# Patient Record
Sex: MALE | Race: WHITE | NOT HISPANIC OR LATINO | Employment: FULL TIME | URBAN - METROPOLITAN AREA
[De-identification: names, ages, dates, MRNs, and addresses within clinical notes are randomized per-mention and may not be internally consistent; named-entity substitution may affect disease eponyms.]

---

## 2017-12-06 ENCOUNTER — NURSE TRIAGE (OUTPATIENT)
Dept: NURSING | Facility: CLINIC | Age: 22
End: 2017-12-06

## 2017-12-06 NOTE — TELEPHONE ENCOUNTER
Additional Information    Negative: Shock suspected (very weak, limp, not moving, too weak to stand, pale cool skin)    Negative: Sounds like a life-threatening emergency to the triager    Negative: Vomiting occurs without diarrhea    Negative: Diarrhea is the main symptom (vomiting is resolved)    Negative: [1] Vomiting and/or diarrhea is present AND [2] age > 1 year AND [3] ate spoiled food in previous 12 hours    Negative: [1] Diarrhea present AND [2] sounds like infant spitting up (reflux)    Negative: Severe dehydration suspected (very dizzy when tries to stand or has fainted)    Negative: [1] Blood (red or coffee grounds color) in the vomit AND [2] not from a nosebleed  (Exception: Few streaks AND only occurs once AND age > 1 year)    Negative: Difficult to awaken    Negative: Confused (delirious) when awake    Negative: Poisoning suspected (with a medicine, plant or chemical)    Negative: [1] Age < 12 weeks AND [2] fever 100.4 F (38.0 C) or higher rectally    Negative: [1]  (< 1 month old) AND [2] starts to look or act abnormal in any way (e.g., decrease in activity or feeding)    Negative: [1] Bile (green color) in the vomit AND [2] 2 or more times (Exception: Stomach juice which is yellow)    Negative: [1] Age < 12 months AND [2] bile (green color) in the vomit (Exception: Stomach juice which is yellow)    Negative: [1] SEVERE abdominal pain (when not vomiting) AND [2] present > 1 hour    Negative: Appendicitis suspected (e.g., constant pain > 2 hours, RLQ location, walks bent over holding abdomen, jumping makes pain worse, etc)    Negative: [1] Blood in the diarrhea AND [2] 3 or more times (or large amount)    Negative: [1] Dehydration suspected AND [2] age < 1 year (Signs: no urine > 8 hours AND very dry mouth, no tears, ill appearing, etc.)    Negative: [1] Dehydration suspected AND [2] age > 1 year (Signs: no urine > 12 hours AND very dry mouth, no tears, ill appearing, etc.)    Negative:  High-risk child (e.g., diabetes mellitus, recent abdominal surgery)    Negative: [1] Fever AND [2] > 105 F (40.6 C) by any route OR axillary > 104 F (40 C)    Negative: [1] Fever AND [2] weak immune system (sickle cell disease, HIV, splenectomy, chemotherapy, organ transplant, chronic oral steroids, etc)    Negative: Child sounds very sick or weak to the triager    Negative: [1] Age < 12 weeks AND [2] vomited 3 or more times in last 24 hours  (Exception: reflux or spitting up)    Negative: [1] Age < 1 year old AND [2] after receiving frequent sips of ORS per guideline AND [3] continues to vomit 3 or more times AND [4] also has frequent watery diarrhea    Negative: [1] SEVERE vomiting (vomiting everything) > 8 hours (> 12 hours for > 7 yo) AND [2] continues after giving frequent sips of ORS using correct technique per guideline    Negative: [1] Continuous abdominal pain or crying AND [2] persists > 2 hours  (Caution: intermittent abdominal pain that comes on with vomiting and then goes away is common)    Negative: Vomiting an essential medicine    Negative: [1] Recent hospitalization AND [2] child not improved or WORSE    Negative: [1] Age < 1 year old AND [2] MODERATE vomiting (3-7 times/day) with diarrhea AND [3] present > 24 hours    Negative: [1] Age > 1 year old AND [2] MODERATE vomiting (3-7 times/day) with diarrhea AND [3] present > 48 hours    Negative: [1] Blood in the stool AND [2] 1 or 2 times AND [3] small amount    Negative: Fever present > 3 days (72 hours)    Negative: [1] MILD vomiting (1-2 times/day) with diarrhea AND [2] persists > 1 week    Negative: Vomiting is a chronic problem (recurrent or ongoing AND present > 4 weeks)    Negative: [1] SEVERE vomiting (8 or more times/day OR vomits everything) with diarrhea BUT [2] hydrated    Negative: [1] MODERATE vomiting (3-7 times/day) with diarrhea AND [2] age < 1 year old AND [3] present < 24 hours    [1] MODERATE vomiting (3-7 times/day) with diarrhea  AND [2] age > 1 year old AND [3] present < 48 hours    Protocols used: VOMITING WITH DIARRHEA-PEDIATRIC-    Van calls and says that He has vomited at least 5 times today and has the diarrhea, too. Pt. Says that he thinks that he has the stomach flu. Temperature is unknown.

## 2019-07-09 ENCOUNTER — ANCILLARY PROCEDURE (OUTPATIENT)
Dept: GENERAL RADIOLOGY | Facility: CLINIC | Age: 24
End: 2019-07-09
Attending: NURSE PRACTITIONER
Payer: COMMERCIAL

## 2019-07-09 ENCOUNTER — OFFICE VISIT (OUTPATIENT)
Dept: FAMILY MEDICINE | Facility: CLINIC | Age: 24
End: 2019-07-09
Payer: COMMERCIAL

## 2019-07-09 VITALS
BODY MASS INDEX: 27.24 KG/M2 | TEMPERATURE: 98.3 F | HEART RATE: 58 BPM | RESPIRATION RATE: 12 BRPM | WEIGHT: 201.1 LBS | DIASTOLIC BLOOD PRESSURE: 70 MMHG | HEIGHT: 72 IN | OXYGEN SATURATION: 97 % | SYSTOLIC BLOOD PRESSURE: 118 MMHG

## 2019-07-09 DIAGNOSIS — S89.92XD INJURY OF LEFT KNEE, SUBSEQUENT ENCOUNTER: Primary | ICD-10-CM

## 2019-07-09 DIAGNOSIS — S89.92XD INJURY OF LEFT KNEE, SUBSEQUENT ENCOUNTER: ICD-10-CM

## 2019-07-09 PROCEDURE — 99203 OFFICE O/P NEW LOW 30 MIN: CPT | Performed by: NURSE PRACTITIONER

## 2019-07-09 PROCEDURE — 73562 X-RAY EXAM OF KNEE 3: CPT | Mod: LT

## 2019-07-09 ASSESSMENT — PAIN SCALES - GENERAL: PAINLEVEL: MILD PAIN (2)

## 2019-07-09 ASSESSMENT — MIFFLIN-ST. JEOR: SCORE: 1955.18

## 2019-07-09 NOTE — LETTER
[facility]  [address] [address]  [phone]          July 9, 2019    RE:  Van Celaya                                                                                                                                                       [address] [place] [address]            To whom it may concern:    Van Celaya was seen in my clinic today for follow up of his knee injury. He had xrays done. Will need an MRI schedule in the next one week.        Sincerely,        Juana Naidu, CNP

## 2019-07-09 NOTE — PATIENT INSTRUCTIONS
Schedule MRI: [phone]      Ice for 15-20 minutes 4-6 times daily.   NSAID (ibuprofen 600 mg every 6 hours or aleve 2 tabs twice daily) for pain and inflammation.          Thank you for choosing [facility].  You may be receiving an email and/or telephone survey request from [facility] Customer Experience regarding your visit today.  Please take a few minutes to respond to the survey to let us know how we are doing.      If you have questions or concerns, please contact us via [employer] or you can contact your care team at [phone].    Our Clinic hours are:  Monday 6:40 am  to 7:00 pm  Tuesday -Friday 6:40 am to 5:00 pm    The [place] outpatient lab hours are:  Monday - Friday 6:10 am to 4:45 pm  Saturdays 7:00 am to 11:00 am  Appointments are required, call [phone]    If you have clinical questions after hours or would like to schedule an appointment,  call the clinic at [phone].

## 2019-07-09 NOTE — PROGRESS NOTES
"Subjective     Van Celaya is a 23 year old male who presents to clinic today for the following health issues:      Joint Pain  - Not sure if this will be work comp or not yet, not sure if these current symptoms are related     Onset: Recently 2 days ago    Did have work injury 2 months ago- was seen at [facility]     Current pain is the same as it was at initial injury 2 months ago    Description:   Location: left knee  Character: Dull ache, tightness     Intensity: mild to severe     Progression of Symptoms: worse    Accompanying Signs & Symptoms:  Other symptoms: numbness, swelling and \"popping\", tightness  Pain is worse with bending /using knee.     History:   Previous similar pain: YES- 2 months ago injured knee at work lifting a heavy object       Precipitating factors:   Trauma or overuse: YES- 2 months ago injured knee while lifting. 2 days ago knee started hurting again - no new trauma or event, just started again out of the blue.    Alleviating factors:  Improved by: rest/inactivity and ice    Therapies Tried and outcome: rest, ice- helps pain/tightness- Not swelling               Reviewed and updated as needed this visit by Provider  Tobacco  Allergies  Meds  Problems  Med Hx  Surg Hx  Fam Hx         Review of Systems   ROS COMP: Constitutional, HEENT, cardiovascular, pulmonary, gi and gu systems are negative, except as otherwise noted.      Objective    /70 (BP Location: Right arm, Patient Position: Chair, Cuff Size: Adult Regular)   Pulse 58   Temp 98.3  F (36.8  C) (Tympanic)   Resp 12   Ht 1.829 m (6')   Wt 91.2 kg (201 lb 1.6 oz)   SpO2 97%   BMI 27.27 kg/m    Body mass index is 27.27 kg/m .  Physical Exam   GENERAL: healthy, alert and no distress  MS: knee exam swelling, tenderness to palpation-along medial joint line, range of motion flexion mildly reduced otherwise ROM normal, collateral ligaments intact to stress, negative Matthew sign, negative Lachmann " sign      Xray independently reviewed. Radiologist read pending.          Assessment & Plan       ICD-10-CM    1. Injury of left knee, subsequent encounter S89.92XD XR Knee Left 3 Views     MR Knee Left w/o Contrast     Injury at work 2 months ago.  Pain has returned, now with swelling, popping and feelings of instability.  xrays negative.     Patient Instructions     Schedule MRI: [phone]      Ice for 15-20 minutes 4-6 times daily.   NSAID (ibuprofen 600 mg every 6 hours or aleve 2 tabs twice daily) for pain and inflammation.          Thank you for choosing [facility].  You may be receiving an email and/or telephone survey request from [facility] Customer Experience regarding your visit today.  Please take a few minutes to respond to the survey to let us know how we are doing.      If you have questions or concerns, please contact us via [employer] or you can contact your care team at [phone].    Our Clinic hours are:  Monday 6:40 am  to 7:00 pm  Tuesday -Friday 6:40 am to 5:00 pm    The [place] outpatient lab hours are:  Monday - Friday 6:10 am to 4:45 pm  Saturdays 7:00 am to 11:00 am  Appointments are required, call [phone]    If you have clinical questions after hours or would like to schedule an appointment,  call the clinic at [phone].        Return in about 1 week (around 7/16/2019) for schedule MRI.    SHEMAR Barboza CNP  [facility]

## 2022-12-26 ENCOUNTER — HOSPITAL ENCOUNTER (EMERGENCY)
Facility: CLINIC | Age: 27
Discharge: HOME | End: 2022-12-26
Attending: NURSE PRACTITIONER | Admitting: NURSE PRACTITIONER
Payer: COMMERCIAL

## 2022-12-26 VITALS
BODY MASS INDEX: 27.26 KG/M2 | WEIGHT: 201 LBS | RESPIRATION RATE: 18 BRPM | OXYGEN SATURATION: 96 % | DIASTOLIC BLOOD PRESSURE: 73 MMHG | TEMPERATURE: 98.2 F | SYSTOLIC BLOOD PRESSURE: 118 MMHG | HEART RATE: 62 BPM

## 2022-12-26 DIAGNOSIS — M25.562 ACUTE PAIN OF LEFT KNEE: ICD-10-CM

## 2022-12-26 PROCEDURE — 99202 OFFICE O/P NEW SF 15 MIN: CPT | Performed by: NURSE PRACTITIONER

## 2022-12-26 PROCEDURE — G0463 HOSPITAL OUTPT CLINIC VISIT: HCPCS | Performed by: NURSE PRACTITIONER

## 2022-12-26 ASSESSMENT — ACTIVITIES OF DAILY LIVING (ADL): ADLS_ACUITY_SCORE: 35

## 2022-12-26 NOTE — Clinical Note
Van Celaya was seen and treated in our emergency department on 12/26/2022.  He may return to work on 12/27/2022.       If you have any questions or concerns, please don't hesitate to call.      Medina Guillen, SHEMAR CNP

## 2022-12-27 NOTE — DISCHARGE INSTRUCTIONS
I recommend schedule an MRI at [facility] on your way out the door.  Tomorrow morning call orthopedics on the discharge paperwork and schedule a follow-up ointment with [facility] in [place] at [facility].  Provide note for work.

## 2022-12-27 NOTE — ED PROVIDER NOTES
"  History     Chief Complaint   Patient presents with     Knee Pain     HPI  Van Celaya is a 27 year old male who presents to urgent care with 2-week history of left knee pain.  Patient had previous knee surgery on August 6, 2019 by Dr. Trejo of [facility].  Patient reports he was doing just fine until couple months ago when he had some intermittent pain.  Patient states over the past 2 weeks he has increasing pain below the kneecap.  Patient states previously he had a bone chip and this required a \"pin and screw\".  Patient denies any recent trauma and states he has been taking ibuprofen 800 mg daily as needed for pain.  Patient denying any loss of sensation or in stability.  He reports the pain is severe with weightbearing and lifting activities.  Patient otherwise reports he is well and denies any chest pain or cough or shortness of breath or difficulty breathing  Allergies:  No Known Allergies    Problem List:    There are no problems to display for this patient.       Past Medical History:    History reviewed. No pertinent past medical history.    Past Surgical History:    History reviewed. No pertinent surgical history.    Family History:    Family History   Problem Relation Age of Onset     Colorectal Cancer Mother         Stage 4 Colon , spread to Lung and Liver      Cancer Paternal Grandmother         Unsure what type        Social History:  Marital Status:  Single [1]  Social History     Tobacco Use     Smoking status: Every Day     Types: Cigarettes     Smokeless tobacco: Never     Tobacco comments:     4 cigarettes per day    Substance Use Topics     Alcohol use: Yes     Comment: 30 Beers Per Week      Drug use: Never        Medications:    No current outpatient medications on file.      Review of Systems  As mentioned above in the history present illness. All other systems were reviewed and are negative.    Physical Exam   BP: 118/73  Pulse: 62  Temp: 98.2  F (36.8  C)  Resp: 18  Weight: " 91.2 kg (201 lb)  SpO2: 96 %      Physical Exam  Vitals and nursing note reviewed.   Constitutional:       General: He is not in acute distress.     Appearance: He is well-developed. He is not diaphoretic.   HENT:      Head: Normocephalic and atraumatic.      Right Ear: Hearing and external ear normal.      Left Ear: Hearing and external ear normal.      Nose: Nose normal.   Eyes:      General: No scleral icterus.        Right eye: No discharge.         Left eye: No discharge.      Conjunctiva/sclera: Conjunctivae normal.   Cardiovascular:      Rate and Rhythm: Normal rate and regular rhythm.      Heart sounds: Normal heart sounds. No murmur heard.    No friction rub. No gallop.   Pulmonary:      Effort: Pulmonary effort is normal. No respiratory distress.      Breath sounds: Normal breath sounds. No stridor. No wheezing or rales.   Musculoskeletal:         General: No tenderness.      Comments: No obvious palpable deformity of the knee.  No visible swelling noted.   Skin:     General: Skin is warm.      Capillary Refill: Capillary refill takes less than 2 seconds.      Findings: No rash.   Neurological:      Mental Status: He is alert and oriented to person, place, and time.   Psychiatric:         Behavior: Behavior is cooperative.         ED Course                 Procedures    No results found for this or any previous visit (from the past 24 hour(s)).    Medications - No data to display    Assessments & Plan (with Medical Decision Making)     I have reviewed the nursing notes.    I have reviewed the findings, diagnosis, plan and need for follow up with the patient.  27-year-old male presenting to urgent care with acute on chronic left knee pain with previous history of what sounds like ligamentous internal derangement with avulsion type fracture and subsequent surgical repair in 2019.  Patient reporting worsening symptoms over the last 2 weeks.  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  Patient has been treating with ibuprofen without resolution.  Patient denying loss of sensation in the lower extremities.  Low suspicion for clot or infectious etiology.  Will order MRI to be completed on an outpatient basis and feel that x-rays would be of low yield based on previous history and current symptoms.  Orthopedic referral placed for follow-up after the MRI.  Patient verbalized understanding.  Patient discharged in stable condition    There are no discharge medications for this patient.      Final diagnoses:   Acute pain of left knee       12/26/2022   [facility] EMERGENCY DEPT     Medina Guillen, SHEMAR CNP  12/26/22 2034

## 2022-12-28 ENCOUNTER — HOSPITAL ENCOUNTER (OUTPATIENT)
Dept: MRI IMAGING | Facility: HOSPITAL | Age: 27
Discharge: HOME | End: 2022-12-28
Attending: NURSE PRACTITIONER | Admitting: NURSE PRACTITIONER
Payer: COMMERCIAL

## 2022-12-28 DIAGNOSIS — M25.562 ACUTE PAIN OF LEFT KNEE: ICD-10-CM

## 2022-12-28 PROCEDURE — 73721 MRI JNT OF LWR EXTRE W/O DYE: CPT | Mod: LT

## 2023-05-11 ENCOUNTER — HOSPITAL ENCOUNTER (EMERGENCY)
Facility: CLINIC | Age: 28
Discharge: HOME | End: 2023-05-11
Attending: FAMILY MEDICINE | Admitting: FAMILY MEDICINE
Payer: COMMERCIAL

## 2023-05-11 VITALS
WEIGHT: 200 LBS | OXYGEN SATURATION: 97 % | BODY MASS INDEX: 27.09 KG/M2 | HEIGHT: 72 IN | SYSTOLIC BLOOD PRESSURE: 138 MMHG | RESPIRATION RATE: 16 BRPM | TEMPERATURE: 96.8 F | HEART RATE: 75 BPM | DIASTOLIC BLOOD PRESSURE: 84 MMHG

## 2023-05-11 DIAGNOSIS — G47.00 INSOMNIA, UNSPECIFIED TYPE: ICD-10-CM

## 2023-05-11 DIAGNOSIS — F41.9 ANXIETY: ICD-10-CM

## 2023-05-11 LAB
ALBUMIN SERPL BCG-MCNC: 4.6 G/DL (ref 3.5–5.2)
ALP SERPL-CCNC: 65 U/L (ref 40–129)
ALT SERPL W P-5'-P-CCNC: 21 U/L (ref 10–50)
ANION GAP SERPL CALCULATED.3IONS-SCNC: 16 MMOL/L (ref 7–15)
AST SERPL W P-5'-P-CCNC: 22 U/L (ref 10–50)
BASOPHILS # BLD AUTO: 0 10E3/UL (ref 0–0.2)
BASOPHILS NFR BLD AUTO: 0 %
BILIRUB SERPL-MCNC: 0.6 MG/DL
BUN SERPL-MCNC: 8.9 MG/DL (ref 6–20)
CALCIUM SERPL-MCNC: 9.8 MG/DL (ref 8.6–10)
CHLORIDE SERPL-SCNC: 95 MMOL/L (ref 98–107)
CREAT SERPL-MCNC: 0.97 MG/DL (ref 0.67–1.17)
DEPRECATED HCO3 PLAS-SCNC: 22 MMOL/L (ref 22–29)
EOSINOPHIL # BLD AUTO: 0 10E3/UL (ref 0–0.7)
EOSINOPHIL NFR BLD AUTO: 0 %
ERYTHROCYTE [DISTWIDTH] IN BLOOD BY AUTOMATED COUNT: 12 % (ref 10–15)
GFR SERPL CREATININE-BSD FRML MDRD: >90 ML/MIN/1.73M2
GLUCOSE SERPL-MCNC: 103 MG/DL (ref 70–99)
HCT VFR BLD AUTO: 41.1 % (ref 40–53)
HGB BLD-MCNC: 14.5 G/DL (ref 13.3–17.7)
IMM GRANULOCYTES # BLD: 0 10E3/UL
IMM GRANULOCYTES NFR BLD: 0 %
LIPASE SERPL-CCNC: 17 U/L (ref 13–60)
LYMPHOCYTES # BLD AUTO: 2.7 10E3/UL (ref 0.8–5.3)
LYMPHOCYTES NFR BLD AUTO: 29 %
MCH RBC QN AUTO: 30.3 PG (ref 26.5–33)
MCHC RBC AUTO-ENTMCNC: 35.3 G/DL (ref 31.5–36.5)
MCV RBC AUTO: 86 FL (ref 78–100)
MONOCYTES # BLD AUTO: 0.8 10E3/UL (ref 0–1.3)
MONOCYTES NFR BLD AUTO: 8 %
NEUTROPHILS # BLD AUTO: 5.8 10E3/UL (ref 1.6–8.3)
NEUTROPHILS NFR BLD AUTO: 63 %
NRBC # BLD AUTO: 0 10E3/UL
NRBC BLD AUTO-RTO: 0 /100
PLATELET # BLD AUTO: 377 10E3/UL (ref 150–450)
POTASSIUM SERPL-SCNC: 4.5 MMOL/L (ref 3.4–5.3)
PROT SERPL-MCNC: 7.6 G/DL (ref 6.4–8.3)
RBC # BLD AUTO: 4.78 10E6/UL (ref 4.4–5.9)
SODIUM SERPL-SCNC: 133 MMOL/L (ref 136–145)
TROPONIN T SERPL HS-MCNC: <6 NG/L
WBC # BLD AUTO: 9.3 10E3/UL (ref 4–11)

## 2023-05-11 PROCEDURE — 85025 COMPLETE CBC W/AUTO DIFF WBC: CPT | Performed by: FAMILY MEDICINE

## 2023-05-11 PROCEDURE — 99284 EMERGENCY DEPT VISIT MOD MDM: CPT | Mod: 25 | Performed by: FAMILY MEDICINE

## 2023-05-11 PROCEDURE — 36415 COLL VENOUS BLD VENIPUNCTURE: CPT | Performed by: FAMILY MEDICINE

## 2023-05-11 PROCEDURE — 93005 ELECTROCARDIOGRAM TRACING: CPT | Performed by: FAMILY MEDICINE

## 2023-05-11 PROCEDURE — 80053 COMPREHEN METABOLIC PANEL: CPT | Performed by: FAMILY MEDICINE

## 2023-05-11 PROCEDURE — 99285 EMERGENCY DEPT VISIT HI MDM: CPT | Mod: 25 | Performed by: FAMILY MEDICINE

## 2023-05-11 PROCEDURE — 93010 ELECTROCARDIOGRAM REPORT: CPT | Performed by: FAMILY MEDICINE

## 2023-05-11 PROCEDURE — 84484 ASSAY OF TROPONIN QUANT: CPT | Performed by: FAMILY MEDICINE

## 2023-05-11 PROCEDURE — 83690 ASSAY OF LIPASE: CPT | Performed by: FAMILY MEDICINE

## 2023-05-11 RX ORDER — NORTRIPTYLINE HCL 25 MG
25 CAPSULE ORAL AT BEDTIME
Qty: 30 CAPSULE | Refills: 1 | Status: SHIPPED | OUTPATIENT
Start: 2023-05-11 | End: 2024-01-15

## 2023-05-11 ASSESSMENT — COLUMBIA-SUICIDE SEVERITY RATING SCALE - C-SSRS
1. HAVE YOU WISHED YOU WERE DEAD OR WISHED YOU COULD GO TO SLEEP AND NOT WAKE UP?: NO
TOTAL  NUMBER OF ABORTED OR SELF INTERRUPTED ATTEMPTS LIFETIME: NO
ATTEMPT LIFETIME: NO
2. HAVE YOU ACTUALLY HAD ANY THOUGHTS OF KILLING YOURSELF?: NO
TOTAL  NUMBER OF INTERRUPTED ATTEMPTS LIFETIME: NO
6. HAVE YOU EVER DONE ANYTHING, STARTED TO DO ANYTHING, OR PREPARED TO DO ANYTHING TO END YOUR LIFE?: NO

## 2023-05-11 ASSESSMENT — ACTIVITIES OF DAILY LIVING (ADL): ADLS_ACUITY_SCORE: 35

## 2023-05-11 NOTE — ED TRIAGE NOTES
States not sleeping well for about a month, chest pain (tightness, constant for 5 days or so now),      Triage Assessment     Row Name 05/11/23 5033       Triage Assessment (Adult)    Airway WDL WDL       Cardiac WDL    Cardiac WDL X;chest pain       Cognitive/Neuro/Behavioral WDL    Cognitive/Neuro/Behavioral WDL X    Level of Consciousness alert    Arousal Level opens eyes spontaneously    Orientation oriented x 4    Mood/Behavior anxious
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
## 2023-05-11 NOTE — ED NOTES
"\"Not feeling right in my head,I'm kind of panicking right now.\" States he has something going on in his life but doesn't say what it is.    "

## 2023-05-12 NOTE — PLAN OF CARE
Diagnostic Evaluation Consultation  Crisis Assessment    Patient was assessed: Lefty  Patient location: [facility] ED  Was a release of information signed: Yes. Providers included on the release: [facility]      Referral Data and Chief Complaint  Van Celaya is a 27 year old, who uses he/him pronouns, and presents to the ED with family/friends. Patient is referred to the ED by family/friends. Patient is presenting to the ED for the following concerns: Panic Attacks and Anxiety.      Informed Consent and Assessment Methods     Patient is his own guardian. Writer met with patient and explained the crisis assessment process, including applicable information disclosures and limits to confidentiality, assessed understanding of the process, and obtained consent to proceed with the assessment. Patient was observed to be able to participate in the assessment as evidenced by Verval Consent. Assessment methods included conducting a formal interview with patient, review of medical records, collaboration with medical staff, and obtaining relevant collateral information from family and community providers when available..     Over the course of this crisis assessment provided reassurance, offered validation, engaged patient in problem solving and disposition planning, worked with patient on safety and aftercare planning and provided psychoeducation. Patient's response to interventions was Receptive     Summary of Patient Situation    Van Celaya is a 27 year old male who comes in with his grandmother and girlfriend with emotional distress.  He said he is not able to sleep and is consumed with anxiety and feels depressed.  He had seen his primary care physician at the end of April and was prescribed venlafaxine which she took for about 2 weeks but when he escalated the dose he did not like the side effects and so he stopped it.  He was given trazodone for sleep but it did not help.  He admits that he is drinking  "too heavily and is trying to cut back.  He generally does not use any recreational drugs but a few days ago he tried a 5 mg CBD gummy to see if it would help him sleep and it only caused increase in anxiety and gave him suicidal thoughts. Patient denies current or ongoing suicidal and homicidal ideations. Reports he needs help with sleep. Patient endorsed some \"life stressors.\" but did not want to elaborate more on what he meant by that He is not taking any prescription medications.      Brief Psychosocial History  Reports he lives with his dad. Did not graduate high school. Reports history of mental health issues in maternal side of the family. Reports his uncle committed suicide before patient was born. 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     Significant Clinical History  Patient reports he has been struggling with severe anxiety the last 3 months. He had seen his primary care physician at the end of April and was prescribed venlafaxine which she took for about 2 weeks but when he escalated the dose he did not like the side effects and so he stopped it. Patient reports he uses alcohol to self medicate. Denies using other substances.      Collateral Information  Patient asked writer to not call his girlfriend, father or grandma. Unable to get collateral.      Risk Assessment  [place] Suicide Severity Rating Scale Full Clinical Version:5/11/2023  Suicidal Ideation  1. Wish to be Dead (Lifetime): No  2. Non-Specific Active Suicidal Thoughts (Lifetime): No     Suicidal Behavior  Actual Attempt (Lifetime): No  Has subject engaged in non-suicidal self-injurious behavior? (Lifetime): No  Interrupted Attempts (Lifetime): No  Aborted or Self-Interrupted Attempt (Lifetime): No  Preparatory Acts or Behavior (Lifetime): No  C-SSRS Risk (Lifetime/Recent)  Calculated C-SSRS Risk Score (Lifetime/Recent): No Risk Indicated    [place] Suicide Severity Rating Scale Since Last Contact: N/A      "   Validity of evaluation is impacted by presenting factors during interview-patient reports he has been drinking.   Comments regarding subjective versus objective responses to [place] tool: appeared genuine in his responses  Environmental or Psychosocial Events: work or task failure and ongoing abuse of substances  Chronic Risk Factors: history of suicide attempts (1) and chronic and ongoing sleep difficulties   Warning Signs: acting reckless or engaging in risky activities, increasing substance use or abuse, withdrawing from friends, family, and society, anxiety, agitation, unable to sleep, sleeping all the time and dramatic changes in mood  Protective Factors: lives in a responsibly safe and stable environment, help seeking and optimistic outlook - identification of future goals  Interpretation of Risk Scoring, Risk Mitigation Interventions and Safety Plan:    he patient's acute suicide risk was determined to be low due to the following factors: Reduction in the intensity of mood/anxiety symptoms. Patient denies current suicidal ideations. Denies past attempts. Patient is able to contract for safety and agreeable to engaging in outpatient services.       Does the patient have thoughts of harming others? No     Is the patient engaging in sexually inappropriate behavior?  no        Current Substance Abuse     Is there recent substance abuse? Yes, patient reports he drinks almost daily. Tried using THC once to see if it would help him sleep but states it didn't work and it made him feel suicidal.     Was a urine drug screen or blood alcohol level obtained: No       Mental Status Exam     Affect: Appropriate   Appearance: Appropriate    Attention Span/Concentration: Attentive  Eye Contact: Engaged   Fund of Knowledge: Appropriate    Language /Speech Content: Fluent   Language /Speech Volume: Normal    Language /Speech Rate/Productions: Normal    Recent Memory: Intact   Remote Memory: Intact   Mood: Anxious and  Depressed    Orientation to Person: Yes    Orientation to Place: Yes   Orientation to Time of Day: Yes    Orientation to Date: Yes    Situation (Do they understand why they are here?): Yes    Psychomotor Behavior: Normal    Thought Content: Clear   Thought Form: Intact      History of commitment: No      Medication    No current facility-administered medications for this encounter.     No current outpatient medications on file.     Medication changes made in the last two weeks: Yes       Current Care Team    Primary Care Provider: Mitesh Thrasher  Psychiatrist: Humberto Thrasher.   Therapist: No. Patient was referred to a therapist by this writer.  : No     CTSS or ARMHS: No  ACT Team: No  Other: No      Diagnosis  Generalized anxiety disorder - (F41.1)  Alcohol abuse - (F10.1)    Clinical Summary and Substantiation of Recommendations   Patient reports he has been struggling with severe anxiety the last 3 months. He had seen his primary care physician at the end of April and was prescribed venlafaxine which she took for about 2 weeks but when he escalated the dose he did not like the side effects and so he stopped it. Patient reports he uses alcohol to self medicate. Denies using other substances.   PT denies any current suicidal ideation, intent or planning.  PT denies any self harm. Pt denies any current homicidal ideation, intent or planning.  PT is able to engage in safety planning. Pt appears future and goal oriented.  PT is able to engage in a discussion about their protective factors.  PT does not currently appear to be in need of acute stabilization for overt psychosis, nick, delusional thinking or paranoia.  PT is appropriate to transition into outpatient community services at this time.     At the time of discharge, the patient's acute suicide risk was determined to be low due to the following factors: Reduction in the intensity of mood/anxiety symptoms that preceded the admission,  denial of suicidal thoughts, denies feeling helpless or helpless, not currently under the influence of alcohol or illicit substances, denies experiencing command hallucinations, no immediate access to firearms. The patient's acute risk could be higher if noncompliant with their treatment plan, medications, follow-up appointments or using illicit substances or alcohol. Protective factors include: social supports, stable housing  Disposition    Recommended disposition: Individual Therapy and Medication Management       Reviewed case and recommendations with attending provider. Attending Name: Nish Martinez MD       Attending concurs with disposition: Yes       Patient and/or validated legal guardian concurs with disposition: Yes       Final disposition: Individual therapy  and Medication management.       Outpatient Details (if applicable):   Aftercare plan and appointments placed in the AVS and provided to patient: Yes. Given to patient by Nurse    Was lethal means counseling provided as a part of aftercare planning? No;       Assessment Details    Patient interview started at: 7:31 pm and completed at 7:59 pm .     Total duration spent on the patient case in minutes: 1.0 hrs      CPT code(s) utilized: 65556 - Psychotherapy for Crisis - 60 (30-74*) min       Liz Loera, JONSW, MSW, LICSW, Psychotherapist  DEC - Triage & Transition Services  Callback: [phone]      Aftercare Plan  Virtual Individual Therapy: [facility], [facility]    Appointment: Friday May 12, 2023 at 3:00 pm   Therapist: Tucker Richardson MA  [address] [facility], [address] [place], [place]   (P): [phone]  (F): [phone]    If I am feeling unsafe or I am in a crisis, I will:   Contact my established care providers   Call the National Suicide Prevention Lifeline: 330.767.4186   Go to the nearest emergency room   Call 311     Warning signs that I or other people might notice when a crisis is developing for me:     I am having increasing suicidal  thoughts that turn to plans with intent or means  I am having additional urges to self-harm    My emotions are of hopelessness; feeling like there's no way out.  Rage or anger.  Engaging in risky activities without thinking  Withdrawing from family/friends  Dramatic mood swings  Drastic personality changes   Use of alcohol or drugs  Postings on social media  Neglect of personal hygiene or cares      Things I am able to do on my own to cope or help me feel better:    Other things to Try:  Spending quality time with loved ones  Staying hydrated  Eating balanced meals  Going for a walk every day  Take care of daily responsibilities/needs  Focus on positive self-talk vs negative self-talk     Things that I am able to do with others to cope or help me better:   Other things to Try:  Exercise  Music  Deep breathing  Meditations  Journal  Self-regulate  Self check-in  Ask for help     Things I can use or do for distraction:   Other things to Try:  Reach out to/spend time with family, friends  Shower  Exercise  Chores or do a project  Listen to music  Watch movie/TV  Listening to music  Journaling  Reading a book  Meditating  Call a friend     Changes I can make to support my mental health and wellness:    -I will abstain from all mood altering chemicals not currently prescribed to me   -I will attend scheduled mental health therapy and psychiatric appointments and follow all   recommendations  -I will commit to 30 minutes of self care daily - this can be as simple as taking a shower, going for a   walk, cooking a meal, read, writing, etc  -I will practice square breathing when I begin to feel anxious - in breath through the nose for the count   of 4 and the first line on the square. Out breath through the mouth for the count of 4 for the second line   of the square. Repeat to complete the square. Repeat the square as many times as needed.  - I will use distraction skills of: going for walks, watching TV, spending time  outside, calling a friend or   family member  -Use community resources, including hotline numbers, [facility] crisis and support meetings  -Maintain a daily schedule/routine  -Practice deep breathing skills  -Download a meditation vic and spend 15-20 minutes per day mediating/relaxing. Some apps to   download include: Calm, Headspace and Insight Timer. All 3 of these apps have free version     People in my life that I can ask for help:   Family  Friends      Your [facility] has a mental health crisis team you can call 24/7: [facility] Crisis  [phone]    Crisis Lines  Crisis Text Line  Text 264413  You will be connected with a trained live crisis counselor to provide support.    Por isaac, texto  SHAHIDA a 329048 o texto a 442-AYUDAME en WhatsApp    [employer] (LGBTQ Youth Crisis Line)  9.411.544.4637  text START to 235-806      Community Resources    Community Support Programs (CSP)  Community Support Programs (CSP) are for adults living with a Serious and Persistent Mental Illness. CSP provides coordinated professional care and treatment in the community that includes a broad range of services to meet the individual's unique personal needs, reduce symptoms, and promote recovery. Keep in mind that CSP is not Adult Rehabilitative Mental Health Services (ARMHS). To be eligible you must be 18 years of age or older and have a Serious and Persistent Mental Illness (SPMI). You do not need to be on Medical Assistance (MA).    To connect with CSP in [facility] and apply for this service, please call Behavioral Health Intake at [phone]. You may also stop by the office during business hours or the Drop-In Center, [place] [address], on Wednesdays from 10:00 a.m. to 2:00 p.m. to request an application.    Substance Use Disorder Direct Access Resources    It is recommended that you abstain from all mood altering chemicals. Please contact the  sober support hotline (271-735-8276) as needed; phones are answered 24 hours a day, 7 days a week.    To access substance use treatment you must have a comprehensive assessment completed to begin any treatment program.     If uninsured, please contact your county of residence for eligibility screen to substance use disorder evaluation and treatment:    [place] - [phone]   [place] - [phone]   [place] - [phone]   [place] - [phone]   [place] - [phone]   Devendra - [phone]   [facility] [phone]   [place] - [phone]     If you have private insurance, call the customer service number on the back of your insurance card to find an in-network substance abuse use disorder assessment. The ideal provider will be a treatment facility, licensed in the [facility].     Community DORCAS Evaluations: Clients may call their county for a full list of providers - Availability and services listed belo are subject to change, please call the provider to confirm    [facility]  [phone]  [address] [place] [address], [address]  *Please call the above number to schedule a comprehensive assessment for determination of level of care needs. In person and virtual appointments available Mon-Fri.    [facility], [facility] [address], [address] Floor, [address], [place] [address] (next to the outpatient lab)    Phone: [phone]   Provides bridging services to people with Opiate Use Disorders (OUD) seeking care. This is a front door to Medication Assisted Treatments (MAT), ages 16+  Walk In hours: Monday-Friday 9:00am-3:00pm    [facility]  [phone]  Walk in Assessments: Mon-Friday 7a-1:45p  [address], [place], [address]    [facility] - [facility]  Central Access [phone]  [address] [place] [address], [address]  *by appointment only      Fast Tracker  Linking people to mental health and substance use disorder resources   "Explore.To Yellow Pages.[employer]     [place] Mental Health Warm Line  Peer to peer support  Monday thru Saturday, 12 pm to 10 pm  564.522.8634 or 1.323.433.8785  Text \"Support\" to 83912    National [place] on Mental Illness (FABIOLA)  749.148.6555 or 1.888.FABIOLA.HELPS      Mental Health Apps  My3  https://[employer]/    [employer]  https://[employer]/apps/virtual-hope-box/      Crisis Lines  Crisis Text Line  Text 520722  You will be connected with a trained live crisis counselor to provide support.    Por espanol, texto  SHAHIDA a 766160 o texto a 442-AYUDAME en WhatsApp    National Hope Line  1.800.SUICIDE [7967631]      Community Resources  Fast Tracker  Linking people to mental health and substance use disorder resources  Explore.To Yellow Pages.[employer]     [place] Mental Health Warm Line  Peer to peer support  Monday thru Saturday, 12 pm to 10 pm  969.337.4213 or 1.830.522.9026  Text \"Support\" to 12972    [employer] [place] on Mental Illness (FABIOLA)  256.090.3331 or 1.888.FABIOLA.HELPS      Mental Health Apps  My3  https://[employer]/    [employer]  https://[employer]/apps/virtual-hope-box/      Additional Information  Today you were seen by a licensed mental health professional through Triage and Transition services, Behavioral Healthcare Providers ([facility])  for a crisis assessment in the Emergency Department at [facility].  It is recommended that you follow up with your established providers (psychiatrist, mental health therapist, and/or primary care doctor - as relevant) as soon as possible. Coordinators from [facility] will be calling you in the next 24-48 hours to ensure that you have the resources you need.  You can also contact [facility] coordinators directly at [phone]. You may have been scheduled for or offered an appointment with a mental health provider. [facility] maintains an extensive network of licensed behavioral health providers to connect patients with the services they need.  We do not charge " providers a fee to participate in our referral network.  We match patients with providers based on a patient's specific needs, insurance coverage, and location.  Our first effort will be to refer you to a provider within your care system, and will utilize providers outside your care system as needed.

## 2023-05-12 NOTE — DISCHARGE INSTRUCTIONS
Take nortriptyline 25 mg 1 hour before bed.  Follow-up in primary care for dosage adjustment or you try different medications if this is not helping.  Discontinue all use of alcohol and recreational drugs.  Continue vigorous physical exercise.  Follow-up as scheduled with psychiatry and psychology.    Aftercare Plan  Virtual Individual Therapy: [facility]    Appointment: Friday May 12, 2023 at 3:00 pm   Therapist: Tucker Richardson MA  [address] [place] [address], [address] [place], [place]   (P): [phone]  (F): [phone]    If I am feeling unsafe or I am in a crisis, I will:   Contact my established care providers   Call the National Suicide Prevention Lifeline: 500.122.9911   Go to the nearest emergency room   Call 838     Warning signs that I or other people might notice when a crisis is developing for me:     I am having increasing suicidal thoughts that turn to plans with intent or means  I am having additional urges to self-harm    My emotions are of hopelessness; feeling like there's no way out.  Rage or anger.  Engaging in risky activities without thinking  Withdrawing from family/friends  Dramatic mood swings  Drastic personality changes   Use of alcohol or drugs  Postings on social media  Neglect of personal hygiene or cares      Things I am able to do on my own to cope or help me feel better:    Other things to Try:  Spending quality time with loved ones  Staying hydrated  Eating balanced meals  Going for a walk every day  Take care of daily responsibilities/needs  Focus on positive self-talk vs negative self-talk     Things that I am able to do with others to cope or help me better:   Other things to Try:  Exercise  Music  Deep breathing  Meditations  Journal  Self-regulate  Self check-in  Ask for help     Things I can use or do for distraction:   Other things to Try:  Reach out to/spend time with family, friends  Shower  Exercise  Chores or do a project  Listen to music  Watch movie/TV  Listening  to music  Journaling  Reading a book  Meditating  Call a friend     Changes I can make to support my mental health and wellness:    -I will abstain from all mood altering chemicals not currently prescribed to me   -I will attend scheduled mental health therapy and psychiatric appointments and follow all   recommendations  -I will commit to 30 minutes of self care daily - this can be as simple as taking a shower, going for a   walk, cooking a meal, read, writing, etc  -I will practice square breathing when I begin to feel anxious - in breath through the nose for the count   of 4 and the first line on the square. Out breath through the mouth for the count of 4 for the second line   of the square. Repeat to complete the square. Repeat the square as many times as needed.  - I will use distraction skills of: going for walks, watching TV, spending time outside, calling a friend or   family member  -Use community resources, including hotline numbers, [facility] crisis and support meetings  -Maintain a daily schedule/routine  -Practice deep breathing skills  -Download a meditation vic and spend 15-20 minutes per day mediating/relaxing. Some apps to   download include: Calm, Headspace and Insight Timer. All 3 of these apps have free version     People in my life that I can ask for help:   Family  Friends      Your [facility] has a mental health crisis team you can call 24/7: [facility]  [phone]    Crisis Lines  Crisis Text Line  Text 834097  You will be connected with a trained live crisis counselor to provide support.    Por isaac, texto  SHAHIDA a 391088 o texto a 442-AYUDAME en WhatsApp    [employer] (LGBTQ Youth Crisis Line)  6.311.268.4418  text START to 113-622      Community Resources    Community Support Programs (CSP)  Community Support Programs (CSP) are for adults living with a Serious and Persistent Mental Illness. CSP provides coordinated professional care and treatment in the community  that includes a broad range of services to meet the individual's unique personal needs, reduce symptoms, and promote recovery. Keep in mind that [facility] is not Adult Rehabilitative Mental Health Services (ARMHS). To be eligible you must be 18 years of age or older and have a Serious and Persistent Mental Illness (SPMI). You do not need to be on Medical Assistance (MA).    To connect with [facility] in [facility] and apply for this service, please call Behavioral Health Intake at [phone]. You may also stop by the office during business hours or the Drop-In Center, [place] [address], on Wednesdays from 10:00 a.m. to 2:00 p.m. to request an application.    Substance Use Disorder Direct Access Resources    It is recommended that you abstain from all mood altering chemicals. Please contact the sober support hotline (393-890-2609) as needed; phones are answered 24 hours a day, 7 days a week.    To access substance use treatment you must have a comprehensive assessment completed to begin any treatment program.     If uninsured, please contact your county of residence for eligibility screen to substance use disorder evaluation and treatment:    [place] - [phone]   [place] - [phone]   [facility] [phone]   [place]  [phone]   [facility] [phone]   [facility] [phone]   [facility] [phone]   [place] - [phone]     If you have private insurance, call the customer service number on the back of your insurance card to find an in-network substance abuse use disorder assessment. The ideal provider will be a treatment facility, licensed in the state [facility].     Community DORCAS Evaluations: Clients may call their county for a full list of providers - Availability and services listed belo are subject to change, please call the provider to confirm    [facility]  [phone] [address] [place], [place], [address]  *Please call  "the above number to schedule a comprehensive assessment for determination of level of care needs. In person and virtual appointments available Mon-Fri.    [facility], [address] [place] [address], First Floor, [address], [place] [address] (next to the outpatient lab)    Phone: [phone]   Provides bridging services to people with Opiate Use Disorders (OUD) seeking care. This is a front door to Medication Assisted Treatments (MAT), ages 16+  Walk In hours: Monday-Friday 9:00am-3:00pm    [facility]  [phone]  Walk in Assessments: Mon-Friday 7a-1:45p  [facility], [id number] [place] [address] Recovery - People [facility] Access [phone]  [address] [place] [address], [address]  *by appointment only      Fast Tracker  Linking people to mental health and substance use disorder resources  iFollo.Proteopure     [place] Mental Health Warm Line  Peer to peer support  Monday thru Saturday, 12 pm to 10 pm  [phone] or 8.828.164.9248  Text \"Support\" to 11789    National [place] on Mental Illness (FABIOLA)  609.574.6839 or 1.888.FABIOLA.HELPS      Mental Health Apps  My3  https://Power Assure/    Packetzoom  https://[employer].org/apps/virtual-hope-box/      Crisis Lines  Crisis Text Line  Text 774120  You will be connected with a trained live crisis counselor to provide support.    Por espanol, texto  SHAHIDA a 145087 o texto a 442-AYUDAME en WhatsApp    National Hope Line  1.800.SUICIDE [4323415]      Community Resources  Fast Tracker  Linking people to mental health and substance use disorder resources  iFollo.Proteopure     [place] Mental Health Warm Line  Peer to peer support  Monday thru Saturday, 12 pm to 10 pm  917.664.4525 or 3.185.540.8735  Text \"Support\" to 93275    National [place] on Mental Illness (FABIOLA)  041.747.1326 or 1.888.FABIOLA.HELPS      Mental Health Apps  My3  https://DAQRI.Proteopure/    VirtualGigyaeBox  " https://Coaxis/apps/virtual-hope-box/      Additional Information  Today you were seen by a licensed mental health professional through Triage and Transition services, [facility] (P)  for a crisis assessment in the Emergency Department at [facility].  It is recommended that you follow up with your established providers (psychiatrist, mental health therapist, and/or primary care doctor - as relevant) as soon as possible. Coordinators from [facility] will be calling you in the next 24-48 hours to ensure that you have the resources you need.  You can also contact [facility] coordinators directly at [phone]. You may have been scheduled for or offered an appointment with a mental health provider. [facility] maintains an extensive network of licensed behavioral health providers to connect patients with the services they need.  We do not charge providers a fee to participate in our referral network.  We match patients with providers based on a patient's specific needs, insurance coverage, and location.  Our first effort will be to refer you to a provider within your care system, and will utilize providers outside your care system as needed.

## 2024-01-15 ENCOUNTER — OFFICE VISIT (OUTPATIENT)
Dept: URGENT CARE | Facility: URGENT CARE | Age: 29
End: 2024-01-15
Payer: COMMERCIAL

## 2024-01-15 VITALS
BODY MASS INDEX: 27.26 KG/M2 | WEIGHT: 201 LBS | OXYGEN SATURATION: 99 % | RESPIRATION RATE: 16 BRPM | TEMPERATURE: 97.8 F | DIASTOLIC BLOOD PRESSURE: 75 MMHG | HEART RATE: 68 BPM | SYSTOLIC BLOOD PRESSURE: 126 MMHG

## 2024-01-15 DIAGNOSIS — K13.79 UVULAR EDEMA: ICD-10-CM

## 2024-01-15 DIAGNOSIS — R07.0 THROAT PAIN: Primary | ICD-10-CM

## 2024-01-15 LAB
DEPRECATED S PYO AG THROAT QL EIA: NEGATIVE
GROUP A STREP BY PCR: NOT DETECTED

## 2024-01-15 PROCEDURE — 87651 STREP A DNA AMP PROBE: CPT | Performed by: EMERGENCY MEDICINE

## 2024-01-15 PROCEDURE — 99213 OFFICE O/P EST LOW 20 MIN: CPT | Performed by: EMERGENCY MEDICINE

## 2024-01-15 RX ORDER — METHYLPREDNISOLONE 4 MG
TABLET, DOSE PACK ORAL
Qty: 21 TABLET | Refills: 0 | Status: SHIPPED | OUTPATIENT
Start: 2024-01-15

## 2024-01-15 RX ORDER — CETIRIZINE HYDROCHLORIDE 10 MG/1
10 TABLET ORAL DAILY
Qty: 7 TABLET | Refills: 0 | Status: SHIPPED | OUTPATIENT
Start: 2024-01-15 | End: 2024-01-22

## 2024-01-15 RX ORDER — CLONAZEPAM 1 MG/1
1 TABLET ORAL AT BEDTIME
COMMUNITY
Start: 2023-12-05

## 2024-01-15 NOTE — PROGRESS NOTES
CHIEF COMPLAINT: Swollen uvula      HPI: Patient is a 28-year-old male whose had intermittent symptoms of a swollen uvula causing him to feel somewhat show Key and a sense of foreign body in his throat.  He has had throat irritation, has occasional headaches.  He has had insomnia.  He is recently being tapered off his Klonopin.  No new medications.  He does state that he has been caring for a dog indoor which is a similar timeline to his throat symptoms.      ROS: See HPI otherwise normal.    No Known Allergies   Current Outpatient Medications   Medication Sig Dispense Refill    cetirizine (ZYRTEC) 10 MG tablet Take 1 tablet (10 mg) by mouth daily for 7 days 7 tablet 0    clonazePAM (KLONOPIN) 1 MG tablet Take 3 mg by mouth at bedtime      methylPREDNISolone (MEDROL DOSEPAK) 4 MG tablet therapy pack Follow Package Directions 21 tablet 0    nortriptyline (PAMELOR) 25 MG capsule Take 1 capsule (25 mg) by mouth At Bedtime (Patient not taking: Reported on 1/15/2024) 30 capsule 1         PE: No acute distress on physical exam.  Vital signs normal.  He is alert and oriented.  HEENT reveals moist oral mucous membranes.  His uvula is slightly pendulous but no significant swelling otherwise.  His throat is mildly erythematous with no exudate abscess or any other abnormalities.  Neck reveals no adenopathy or masses.  Lungs are clear.  Heart is regular.        TREATMENT: Rapid strep negative      ASSESSMENT: Uvula edema may be related to an allergic response to the dog.  Will direct medication in that regard with short-term follow-up if not better.  Patient is undergoing quite a bit of stress which she discussed but plans to follow-up with a counselor.  Is tapering off his Klonopin is under the care of a psychiatrist.      DIAGNOSIS: Uvula edema.      PLAN: Zyrtec, Medrol Dosepak.  Ice chips or cold fluids if symptomatic.  Recheck 1 week if symptoms persist, sooner if worse

## 2024-01-15 NOTE — LETTER
January 15, 2024      Van Celaya  [address] [place] [address]        To Whom It May Concern:    Van Celaya  was seen on 1/15/24.  Please excuse him  until 1/16/24 due to illness.        Sincerely,        Gomez Cloud MD

## 2024-06-22 ENCOUNTER — OFFICE VISIT (OUTPATIENT)
Dept: URGENT CARE | Facility: URGENT CARE | Age: 29
End: 2024-06-22
Payer: COMMERCIAL

## 2024-06-22 VITALS
TEMPERATURE: 98 F | RESPIRATION RATE: 16 BRPM | HEART RATE: 68 BPM | BODY MASS INDEX: 29.84 KG/M2 | OXYGEN SATURATION: 98 % | SYSTOLIC BLOOD PRESSURE: 122 MMHG | WEIGHT: 220 LBS | DIASTOLIC BLOOD PRESSURE: 76 MMHG

## 2024-06-22 DIAGNOSIS — R06.2 WHEEZING: ICD-10-CM

## 2024-06-22 DIAGNOSIS — R05.2 SUBACUTE COUGH: ICD-10-CM

## 2024-06-22 DIAGNOSIS — Q38.6 LONG UVULA: Primary | ICD-10-CM

## 2024-06-22 PROCEDURE — 99213 OFFICE O/P EST LOW 20 MIN: CPT | Performed by: EMERGENCY MEDICINE

## 2024-06-22 RX ORDER — ALBUTEROL SULFATE 90 UG/1
2 AEROSOL, METERED RESPIRATORY (INHALATION) 4 TIMES DAILY
Qty: 18 G | Refills: 0 | Status: SHIPPED | OUTPATIENT
Start: 2024-06-22 | End: 2024-06-29

## 2024-06-22 RX ORDER — BENZONATATE 100 MG/1
100 CAPSULE ORAL 3 TIMES DAILY PRN
COMMUNITY
End: 2024-06-26

## 2024-06-22 RX ORDER — AZITHROMYCIN 250 MG/1
TABLET, FILM COATED ORAL
Qty: 6 TABLET | Refills: 0 | Status: SHIPPED | OUTPATIENT
Start: 2024-06-22 | End: 2024-06-27

## 2024-06-22 RX ORDER — PREDNISONE 50 MG/1
50 TABLET ORAL DAILY
Qty: 5 TABLET | Refills: 0 | Status: SHIPPED | OUTPATIENT
Start: 2024-06-22 | End: 2024-06-27

## 2024-06-22 NOTE — PROGRESS NOTES
CHIEF COMPLAINT: Persistent respiratory infection      HPI: Patient is a 28-year-old male who has now been ill for 8 days and not getting better.  He has had a cough, sinus congestion and sore throat.  He does note subjective wheezing.  Patient has no history of asthma.  He does vape.  He also complains of a pendulous uvula which is bothering him.  Patient had telephone appointment yesterday for which she was given Tessalon with little improvement.      ROS: See HPI otherwise normal    Allergies   Allergen Reactions    Nortriptyline Other (See Comments)     Poor sleep and acutely worsening thoughts      Current Outpatient Medications   Medication Sig Dispense Refill    albuterol (PROAIR HFA/PROVENTIL HFA/VENTOLIN HFA) 108 (90 Base) MCG/ACT inhaler Inhale 2 puffs into the lungs 4 times daily for 7 days 18 g 0    azithromycin (ZITHROMAX) 250 MG tablet Take 2 tablets (500 mg) by mouth daily for 1 day, THEN 1 tablet (250 mg) daily for 4 days. 6 tablet 0    benzonatate (TESSALON) 100 MG capsule Take 100 mg by mouth 3 times daily as needed for cough      predniSONE (DELTASONE) 50 MG tablet Take 1 tablet (50 mg) by mouth daily for 5 days 5 tablet 0    clonazePAM (KLONOPIN) 1 MG tablet Take 1 mg by mouth at bedtime (Patient not taking: Reported on 6/22/2024)      methylPREDNISolone (MEDROL DOSEPAK) 4 MG tablet therapy pack Follow Package Directions (Patient not taking: Reported on 6/22/2024) 21 tablet 0         PE: No acute distress.  Vital signs normal including a pulse ox of 98%.  HEENT reveals moist oral mucous membranes.  Posterior pharynx is mildly erythematous with no exudate.  Ears show slight TM retraction but no signs of infection.  Lungs reveal scattered expiratory wheezes.  Patient has good excursion.  No rales heard.  Heart is regular.        TREATMENT: None.      ASSESSMENT: Persistent upper respiratory infections including sinus infection symptoms at 8 days.  Possibility of bacterial sequela I plus reactive  airway symptoms addressed      DIAGNOSIS: Cough.  Sinusitis.      PLAN: Z-Massimo, prednisone, albuterol.  Follow-up 1 week if still ill, sooner if worse.  ENT referral placed for his pendulous uvula.

## 2024-06-22 NOTE — PATIENT INSTRUCTIONS
Z-Massimo-antibiotics for chest and sinuses  Prednisone, albuterol inhaler for coughing and wheezing  Follow-up 1 week if still ill.  Sooner if getting worse.  Contact ENT clinic tomorrow to arrange follow-up for your throat.

## 2024-06-24 ASSESSMENT — ENCOUNTER SYMPTOMS
GASTROINTESTINAL NEGATIVE: 1
CONSTITUTIONAL NEGATIVE: 1
EYES NEGATIVE: 1

## 2024-06-24 NOTE — PROGRESS NOTES
"Chief Complaint   Patient presents with    Consult     Pendulous uvula \"for a long time,\" worst with speaking. Seen in urgent care 6/22/2024. Recently diagnosed with MOO. Symptoms pain discomfort, hard to breathe, dry throat after an hour, clearing of throat and tried to keep hydrated with fluids. Currently completing medications from urgent care Azithromycin, albuterol inhaler, and Prednisone, no improvement with use.      PCP: [facility]     Referring Provider: Gomez Cloud    There were no vitals taken for this visit.    ENT Problem List:  There is no problem list on file for this patient.     Current Medications:  Current Outpatient Medications   Medication Sig Dispense Refill    albuterol (PROAIR HFA/PROVENTIL HFA/VENTOLIN HFA) 108 (90 Base) MCG/ACT inhaler Inhale 2 puffs into the lungs 4 times daily for 7 days 18 g 0    azithromycin (ZITHROMAX) 250 MG tablet Take 2 tablets (500 mg) by mouth daily for 1 day, THEN 1 tablet (250 mg) daily for 4 days. 6 tablet 0    clonazePAM (KLONOPIN) 1 MG tablet Take 1 mg by mouth at bedtime      predniSONE (DELTASONE) 50 MG tablet Take 1 tablet (50 mg) by mouth daily for 5 days 5 tablet 0    methylPREDNISolone (MEDROL DOSEPAK) 4 MG tablet therapy pack Follow Package Directions (Patient not taking: Reported on 6/26/2024) 21 tablet 0     No current facility-administered medications for this visit.     HPI  Pleasant 28 year old male presents today as a(n) new patient for pendulous uvula \"for a long time\" that is worse with speaking. He reports that he was seen in urgent care on 6/22/2024. He states that he is currently completing medications from urgent care- Azithromycin, albuterol inhaler, and Prednisone, with no improvement in symptoms.   He reports constant throat irritation with his uvula touching his tongue causing gagging, throat clearing, and restricted airways.  He reports intermittent dysphagia. He states that he is always " "hacking up phlegm/clearing his throat and drinks a lot of water. He reports that when he spits, it is usually pretty white although this morning he noticed blood in his spit.  He reports post nasal drip.  He reports that he was recently diagnosed with mild MOO. He states that he snores at night and has a CPAP machine. He states that he has not been wearing his CPAP machine the past week due to getting over a cold.  He states that his breathing through his nose is normally \"alright\", although he blows his nose often. He reports that today he is a bit stuffy.  He reports that he thinks he is developing seasonal allergies.    He denies fevers, sore throats, hx of tonsil stones, heartburn.    Review of Systems   Constitutional: Negative.    HENT:  Positive for congestion. Negative for sore throat.    Eyes: Negative.    Respiratory:  Positive for cough and sputum production.    Gastrointestinal: Negative.  Negative for heartburn.   Skin: Negative.    Endo/Heme/Allergies:  Positive for environmental allergies.       Physical Exam  Vitals and nursing note reviewed.   Constitutional:       Appearance: Normal appearance.   HENT:      Head: Normocephalic and atraumatic.      Jaw: There is normal jaw occlusion.      Right Ear: Hearing, tympanic membrane and ear canal normal.      Left Ear: Hearing, tympanic membrane and ear canal normal.      Nose: Congestion present. No mucosal edema or rhinorrhea.      Right Nostril: No occlusion.      Left Nostril: No occlusion.      Right Turbinates: Swollen. Not enlarged.      Left Turbinates: Swollen. Not enlarged.      Right Sinus: No maxillary sinus tenderness or frontal sinus tenderness.      Left Sinus: No maxillary sinus tenderness or frontal sinus tenderness.      Mouth/Throat:      Mouth: Mucous membranes are moist.      Pharynx: Oropharynx is clear. Uvula midline. No uvula swelling.      Tonsils: 3+ on the right. 3+ on the left.   Eyes:      Extraocular Movements: Extraocular " movements intact.      Pupils: Pupils are equal, round, and reactive to light.   Neurological:      Mental Status: He is alert.       POLYSOMNOGRAPHIC SUMMARY 9/6/2023:  Lights Out: 2233 Lights On: 0611    SLEEP CONTINUITY & SLEEP ARCHITECTURE:   Sleep architecture was Abnormal.   Sleep latency at 4.3 mins.  REM latency of 132 mins.   Sleep aid was taken. Lunesta per RX @ 2216  Extended Montage: Yes 4 Limb.    RESPIRATORY MEASURES:   The patient demonstrated mild snoring. The patient was observed sleeping in the supine position during REM sleep. Respiratory events did appear to correlate with supine position.  RERA:yes  Hypopneas present:yes  Obstructive apneas:yes  Central apneas:yes  Mixed apneas:no  Respiratory events appeared to be primarily Obstructive in nature.  The lowest SpO2 was 87%.     Oxygen was not applied.    Patient did not meet criteria for CPAP titration.   LIMB MOVEMENTS:   There were limb movements during sleep.     EKG & HEART RATE:   Baseline single-lead EKG demonstrated a sleep-associated heart rate of 55 bpm. The heart rate was irregular in rhythm, and associated sustained arrhythmias were not observed during the study.     VIDEO FINDINGS:   Continuous video monitoring by sleep tech during the sleep study did not demonstrate any significant stereotypical behaviors or sleep-related complex behaviors suggestive of a parasomnia or seizure. Some movement in REM was observed no Vocalization noted.   Pt was instructed to follow up with Sleep Specialist for results and appointment card given.     A/P  This pleasant patient has post nasal drip, bilateral turbinate hypertrophy causing nasal congestion, MOO,  tonsillar hypertrophy, and a potential hx of uvula swelling. There are no throat, sinus, or throat infections present.    For post nasal drip and bilateral turbinate hypertrophy causing nasal congestion, he is prescribed azelastine (ASTELIN) 0.1 % nasal spray , spray 2 sprays into both nostrils 2  times daily, today. The option of a turbinoplasty for the future is discussed.    For tonsillar hypertrophy, chronic tonsillitis and mild obstructive sleep apnea, the option of a tonsillectomy is discussed. Pros, cons, complications, recovery, questions, and concerns were addressed. He decides that he would like to schedule the surgery now, and is informed that he can cancel if his condition improves with use of nasal spray.    For a potential hx of uvula swelling, the following blood tests are ordered for further investigation. He will receive a phone call to review the results of these tests:  CRP, inflammation  ESR: Erythrocyte sedimentation rate    Scribe/Staff:    Scribe Disclosure:   I, Lauren Yadav, am serving as a scribe; to document services personally performed by Shelly Barton MD based on data collection and the provider's statements to me.     Provider Disclosure:  I agree with above History, Review of Systems, Physical exam and Plan.  I have reviewed the content of the documentation and have edited it as needed. I have personally performed the services documented here and the documentation accurately represents those services and the decisions I have made.      Electronically signed by:  Shelly Barton MD

## 2024-06-26 ENCOUNTER — OFFICE VISIT (OUTPATIENT)
Dept: OTOLARYNGOLOGY | Facility: CLINIC | Age: 29
End: 2024-06-26
Attending: EMERGENCY MEDICINE
Payer: COMMERCIAL

## 2024-06-26 DIAGNOSIS — J35.01 CHRONIC TONSILLITIS: ICD-10-CM

## 2024-06-26 DIAGNOSIS — J34.3 NASAL TURBINATE HYPERTROPHY: ICD-10-CM

## 2024-06-26 DIAGNOSIS — J35.8 TONSIL STONE: ICD-10-CM

## 2024-06-26 DIAGNOSIS — Q38.6 LONG UVULA: ICD-10-CM

## 2024-06-26 DIAGNOSIS — J35.1 ENLARGED TONSILS: Primary | ICD-10-CM

## 2024-06-26 DIAGNOSIS — G47.33 OSA (OBSTRUCTIVE SLEEP APNEA): ICD-10-CM

## 2024-06-26 LAB
CRP SERPL-MCNC: <3 MG/L
ERYTHROCYTE [SEDIMENTATION RATE] IN BLOOD BY WESTERGREN METHOD: 5 MM/HR (ref 0–15)

## 2024-06-26 PROCEDURE — 86140 C-REACTIVE PROTEIN: CPT | Performed by: OTOLARYNGOLOGY

## 2024-06-26 PROCEDURE — 99203 OFFICE O/P NEW LOW 30 MIN: CPT | Performed by: OTOLARYNGOLOGY

## 2024-06-26 PROCEDURE — 85652 RBC SED RATE AUTOMATED: CPT | Performed by: OTOLARYNGOLOGY

## 2024-06-26 PROCEDURE — 36415 COLL VENOUS BLD VENIPUNCTURE: CPT | Performed by: OTOLARYNGOLOGY

## 2024-06-26 RX ORDER — AZELASTINE 1 MG/ML
2 SPRAY, METERED NASAL 2 TIMES DAILY
Qty: 30 ML | Refills: 1 | Status: SHIPPED | OUTPATIENT
Start: 2024-06-26

## 2024-06-26 ASSESSMENT — ENCOUNTER SYMPTOMS
COUGH: 1
HEARTBURN: 0
SPUTUM PRODUCTION: 1
SORE THROAT: 0

## 2024-06-26 NOTE — NURSING NOTE
"Van Celaya's goals for this visit include:   Chief Complaint   Patient presents with    Consult     Pendulous uvula \"for a long time,\" worst with speaking. Seen in urgent care 6/22/2024. Recently diagnosed with MOO. Symptoms pain discomfort, hard to breathe, dry throat after an hour, clearing of throat and tried to keep hydrated with fluids. Currently completing medications from urgent care Azithromycin, albuterol inhaler, and Prednisone, no improvement with use.     He requests these members of his care team be copied on today's visit information: yes    PCP: [facility]    Referring Provider:  Gomez Cloud MD  [address] [place] [address]    There were no vitals taken for this visit.    Do you need any medication refills at today's visit? no    "

## 2024-06-26 NOTE — LETTER
"6/26/2024      Van Celaya  [address] [place] [address]      Dear Colleague,    Thank you for referring your patient, Van Celaya, to the [facility]. Please see a copy of my visit note below.    Chief Complaint   Patient presents with     Consult     Pendulous uvula \"for a long time,\" worst with speaking. Seen in urgent care 6/22/2024. Recently diagnosed with MOO. Symptoms pain discomfort, hard to breathe, dry throat after an hour, clearing of throat and tried to keep hydrated with fluids. Currently completing medications from urgent care Azithromycin, albuterol inhaler, and Prednisone, no improvement with use.      PCP: [facility]     Referring Provider: Gomez Cloud    There were no vitals taken for this visit.    ENT Problem List:  There is no problem list on file for this patient.     Current Medications:  Current Outpatient Medications   Medication Sig Dispense Refill     albuterol (PROAIR HFA/PROVENTIL HFA/VENTOLIN HFA) 108 (90 Base) MCG/ACT inhaler Inhale 2 puffs into the lungs 4 times daily for 7 days 18 g 0     azithromycin (ZITHROMAX) 250 MG tablet Take 2 tablets (500 mg) by mouth daily for 1 day, THEN 1 tablet (250 mg) daily for 4 days. 6 tablet 0     clonazePAM (KLONOPIN) 1 MG tablet Take 1 mg by mouth at bedtime       predniSONE (DELTASONE) 50 MG tablet Take 1 tablet (50 mg) by mouth daily for 5 days 5 tablet 0     methylPREDNISolone (MEDROL DOSEPAK) 4 MG tablet therapy pack Follow Package Directions (Patient not taking: Reported on 6/26/2024) 21 tablet 0     No current facility-administered medications for this visit.     HPI  Pleasant 28 year old male presents today as a(n) new patient for pendulous uvula \"for a long time\" that is worse with speaking. He reports that he was seen in urgent care on 6/22/2024. He states that he is currently completing medications from urgent care- Azithromycin, albuterol inhaler, and " "Prednisone, with no improvement in symptoms.   He reports constant throat irritation with his uvula touching his tongue causing gagging, throat clearing, and restricted airways.  He reports intermittent dysphagia. He states that he is always hacking up phlegm/clearing his throat and drinks a lot of water. He reports that when he spits, it is usually pretty white although this morning he noticed blood in his spit.  He reports post nasal drip.  He reports that he was recently diagnosed with mild MOO. He states that he snores at night and has a CPAP machine. He states that he has not been wearing his CPAP machine the past week due to getting over a cold.  He states that his breathing through his nose is normally \"alright\", although he blows his nose often. He reports that today he is a bit stuffy.  He reports that he thinks he is developing seasonal allergies.    He denies fevers, sore throats, hx of tonsil stones, heartburn.    Review of Systems   Constitutional: Negative.    HENT:  Positive for congestion. Negative for sore throat.    Eyes: Negative.    Respiratory:  Positive for cough and sputum production.    Gastrointestinal: Negative.  Negative for heartburn.   Skin: Negative.    Endo/Heme/Allergies:  Positive for environmental allergies.       Physical Exam  Vitals and nursing note reviewed.   Constitutional:       Appearance: Normal appearance.   HENT:      Head: Normocephalic and atraumatic.      Jaw: There is normal jaw occlusion.      Right Ear: Hearing, tympanic membrane and ear canal normal.      Left Ear: Hearing, tympanic membrane and ear canal normal.      Nose: Congestion present. No mucosal edema or rhinorrhea.      Right Nostril: No occlusion.      Left Nostril: No occlusion.      Right Turbinates: Swollen. Not enlarged.      Left Turbinates: Swollen. Not enlarged.      Right Sinus: No maxillary sinus tenderness or frontal sinus tenderness.      Left Sinus: No maxillary sinus tenderness or frontal " sinus tenderness.      Mouth/Throat:      Mouth: Mucous membranes are moist.      Pharynx: Oropharynx is clear. Uvula midline. No uvula swelling.      Tonsils: 3+ on the right. 3+ on the left.   Eyes:      Extraocular Movements: Extraocular movements intact.      Pupils: Pupils are equal, round, and reactive to light.   Neurological:      Mental Status: He is alert.       POLYSOMNOGRAPHIC SUMMARY 9/6/2023:  Lights Out: 2233 Lights On: 0611    SLEEP CONTINUITY & SLEEP ARCHITECTURE:   Sleep architecture was Abnormal.   Sleep latency at 4.3 mins.  REM latency of 132 mins.   Sleep aid was taken. Lunesta per RX @ 2216  Extended Montage: Yes 4 Limb.    RESPIRATORY MEASURES:   The patient demonstrated mild snoring. The patient was observed sleeping in the supine position during REM sleep. Respiratory events did appear to correlate with supine position.  RERA:yes  Hypopneas present:yes  Obstructive apneas:yes  Central apneas:yes  Mixed apneas:no  Respiratory events appeared to be primarily Obstructive in nature.  The lowest SpO2 was 87%.     Oxygen was not applied.    Patient did not meet criteria for CPAP titration.   LIMB MOVEMENTS:   There were limb movements during sleep.     EKG & HEART RATE:   Baseline single-lead EKG demonstrated a sleep-associated heart rate of 55 bpm. The heart rate was irregular in rhythm, and associated sustained arrhythmias were not observed during the study.     VIDEO FINDINGS:   Continuous video monitoring by sleep tech during the sleep study did not demonstrate any significant stereotypical behaviors or sleep-related complex behaviors suggestive of a parasomnia or seizure. Some movement in REM was observed no Vocalization noted.   Pt was instructed to follow up with Sleep Specialist for results and appointment card given.     A/P  This pleasant patient has post nasal drip, bilateral turbinate hypertrophy causing nasal congestion, MOO,  tonsillar hypertrophy, and a potential hx of uvula  swelling. There are no throat, sinus, or throat infections present.    For post nasal drip and bilateral turbinate hypertrophy causing nasal congestion, he is prescribed azelastine (ASTELIN) 0.1 % nasal spray , spray 2 sprays into both nostrils 2 times daily, today. The option of a turbinoplasty for the future is discussed.    For tonsillar hypertrophy, chronic tonsillitis and mild obstructive sleep apnea, the option of a tonsillectomy is discussed. Pros, cons, complications, recovery, questions, and concerns were addressed. He decides that he would like to schedule the surgery now, and is informed that he can cancel if his condition improves with use of nasal spray.    For a potential hx of uvula swelling, the following blood tests are ordered for further investigation. He will receive a phone call to review the results of these tests:  CRP, inflammation  ESR: Erythrocyte sedimentation rate    Scribe/Staff:    Scribe Disclosure:   I, Lauren Yadav, am serving as a scribe; to document services personally performed by Shelly Barton MD based on data collection and the provider's statements to me.     Provider Disclosure:  I agree with above History, Review of Systems, Physical exam and Plan.  I have reviewed the content of the documentation and have edited it as needed. I have personally performed the services documented here and the documentation accurately represents those services and the decisions I have made.      Electronically signed by:  Shelly Barton MD         Again, thank you for allowing me to participate in the care of your patient.        Sincerely,        Shelly Barton MD

## 2024-06-27 ENCOUNTER — TELEPHONE (OUTPATIENT)
Dept: OTOLARYNGOLOGY | Facility: CLINIC | Age: 29
End: 2024-06-27
Payer: COMMERCIAL

## 2024-06-27 PROBLEM — G47.33 OSA (OBSTRUCTIVE SLEEP APNEA): Status: ACTIVE | Noted: 2024-06-26

## 2024-06-27 PROBLEM — J35.1 ENLARGED TONSILS: Status: ACTIVE | Noted: 2024-06-26

## 2024-06-27 PROBLEM — J35.01 CHRONIC TONSILLITIS: Status: ACTIVE | Noted: 2024-06-26

## 2024-06-27 PROBLEM — J35.8 TONSIL STONE: Status: ACTIVE | Noted: 2024-06-26

## 2024-06-27 NOTE — TELEPHONE ENCOUNTER
Scheduled surgery with Dr. Barton on 10/31/2024 - offered 9/3/2024 but patient declined    Spoke with: Patient    Surgery is located at [facility]    Patient does not have a PCP but is planning on going to [facility] for their H&P within 30 days of surgery    Does patient need a consult before upcoming surgery? No: already done    Anesthesia type: General    Requested Imaging required for surgery: No    Patient is scheduled for their 3 week post op on 11/22/2024 at 840am with Dr. Barton    Patient will receive their surgery packet via [facility] per their preference - sent patient text to sign up    Patient was not provided a start time for surgery & is aware they will receive this information 3-5 days before surgery    Additional comments: Patient was instructed to call back with any further questions or concerns.     Tabitha Escoto on 6/27/2024 at 12:46 PM

## 2024-06-28 NOTE — TELEPHONE ENCOUNTER
Rescheduled surgery with Dr. Barton from 10/31/2024 to 8/22/2024    Spoke with: Patient    Reason for reschedule: Cancellation    Surgery is located at [facility] [address] [place] [address]    Patient is aware their H&P will need to be within 30 days of their new surgery date     Is there imaging that needs to be rescheduled for new surgery date? No    Patients 3 week post op has been rescheduled to 9/17/2024 at 340pm    Additional comments: Patient will call back if they have any questions or concerns.    Tabitha Escoto on 6/28/2024 at 10:10 AM

## 2024-07-01 ENCOUNTER — TELEPHONE (OUTPATIENT)
Dept: OTOLARYNGOLOGY | Facility: CLINIC | Age: 29
End: 2024-07-01
Payer: COMMERCIAL

## 2024-07-01 NOTE — TELEPHONE ENCOUNTER
Phone call to pt with within normal limits inflammatory marker results.  Pt had no further questions at this time.  Yessenia Bustillos RN

## 2024-07-01 NOTE — TELEPHONE ENCOUNTER
----- Message from Shelly Barton sent at 6/29/2024  3:01 PM CDT -----  Inflammation markers are WNLs.

## 2024-08-16 ENCOUNTER — ANESTHESIA EVENT (OUTPATIENT)
Dept: SURGERY | Facility: AMBULATORY SURGERY CENTER | Age: 29
End: 2024-08-16
Payer: COMMERCIAL

## 2024-08-21 ENCOUNTER — TRANSFERRED RECORDS (OUTPATIENT)
Dept: HEALTH INFORMATION MANAGEMENT | Facility: CLINIC | Age: 29
End: 2024-08-21
Payer: COMMERCIAL

## 2024-08-22 ENCOUNTER — HOSPITAL ENCOUNTER (OUTPATIENT)
Facility: AMBULATORY SURGERY CENTER | Age: 29
Discharge: HOME | End: 2024-08-22
Attending: OTOLARYNGOLOGY | Admitting: OTOLARYNGOLOGY
Payer: COMMERCIAL

## 2024-08-22 ENCOUNTER — NURSE TRIAGE (OUTPATIENT)
Dept: NURSING | Facility: CLINIC | Age: 29
End: 2024-08-22

## 2024-08-22 ENCOUNTER — ANESTHESIA (OUTPATIENT)
Dept: SURGERY | Facility: AMBULATORY SURGERY CENTER | Age: 29
End: 2024-08-22
Payer: COMMERCIAL

## 2024-08-22 VITALS
HEART RATE: 51 BPM | SYSTOLIC BLOOD PRESSURE: 125 MMHG | DIASTOLIC BLOOD PRESSURE: 91 MMHG | RESPIRATION RATE: 11 BRPM | TEMPERATURE: 97.5 F | OXYGEN SATURATION: 96 %

## 2024-08-22 DIAGNOSIS — Z90.89 S/P TONSILLECTOMY AND ADENOIDECTOMY: Primary | ICD-10-CM

## 2024-08-22 PROCEDURE — 42821 REMOVE TONSILS AND ADENOIDS: CPT | Performed by: OTOLARYNGOLOGY

## 2024-08-22 PROCEDURE — 42821 REMOVE TONSILS AND ADENOIDS: CPT | Performed by: NURSE ANESTHETIST, CERTIFIED REGISTERED

## 2024-08-22 PROCEDURE — 88304 TISSUE EXAM BY PATHOLOGIST: CPT | Performed by: STUDENT IN AN ORGANIZED HEALTH CARE EDUCATION/TRAINING PROGRAM

## 2024-08-22 PROCEDURE — 42821 REMOVE TONSILS AND ADENOIDS: CPT

## 2024-08-22 PROCEDURE — G8918 PT W/O PREOP ORDER IV AB PRO: HCPCS

## 2024-08-22 PROCEDURE — 42821 REMOVE TONSILS AND ADENOIDS: CPT | Performed by: ANESTHESIOLOGY

## 2024-08-22 PROCEDURE — G8907 PT DOC NO EVENTS ON DISCHARG: HCPCS

## 2024-08-22 RX ORDER — NALOXONE HYDROCHLORIDE 0.4 MG/ML
0.1 INJECTION, SOLUTION INTRAMUSCULAR; INTRAVENOUS; SUBCUTANEOUS
Status: DISCONTINUED | OUTPATIENT
Start: 2024-08-22 | End: 2024-08-23 | Stop reason: HOSPADM

## 2024-08-22 RX ORDER — PROPOFOL 10 MG/ML
INJECTION, EMULSION INTRAVENOUS CONTINUOUS PRN
Status: DISCONTINUED | OUTPATIENT
Start: 2024-08-22 | End: 2024-08-22

## 2024-08-22 RX ORDER — LIDOCAINE HYDROCHLORIDE 20 MG/ML
INJECTION, SOLUTION INFILTRATION; PERINEURAL PRN
Status: DISCONTINUED | OUTPATIENT
Start: 2024-08-22 | End: 2024-08-22

## 2024-08-22 RX ORDER — FENTANYL CITRATE 50 UG/ML
25 INJECTION, SOLUTION INTRAMUSCULAR; INTRAVENOUS EVERY 5 MIN PRN
Status: DISCONTINUED | OUTPATIENT
Start: 2024-08-22 | End: 2024-08-23 | Stop reason: HOSPADM

## 2024-08-22 RX ORDER — SODIUM CHLORIDE, SODIUM LACTATE, POTASSIUM CHLORIDE, CALCIUM CHLORIDE 600; 310; 30; 20 MG/100ML; MG/100ML; MG/100ML; MG/100ML
INJECTION, SOLUTION INTRAVENOUS CONTINUOUS
Status: DISCONTINUED | OUTPATIENT
Start: 2024-08-22 | End: 2024-08-23 | Stop reason: HOSPADM

## 2024-08-22 RX ORDER — DEXAMETHASONE SODIUM PHOSPHATE 4 MG/ML
4 INJECTION, SOLUTION INTRA-ARTICULAR; INTRALESIONAL; INTRAMUSCULAR; INTRAVENOUS; SOFT TISSUE
Status: DISCONTINUED | OUTPATIENT
Start: 2024-08-22 | End: 2024-08-23 | Stop reason: HOSPADM

## 2024-08-22 RX ORDER — SODIUM CHLORIDE, SODIUM LACTATE, POTASSIUM CHLORIDE, CALCIUM CHLORIDE 600; 310; 30; 20 MG/100ML; MG/100ML; MG/100ML; MG/100ML
INJECTION, SOLUTION INTRAVENOUS CONTINUOUS PRN
Status: DISCONTINUED | OUTPATIENT
Start: 2024-08-22 | End: 2024-08-22

## 2024-08-22 RX ORDER — ACETAMINOPHEN 325 MG/1
975 TABLET ORAL ONCE
Status: COMPLETED | OUTPATIENT
Start: 2024-08-22 | End: 2024-08-22

## 2024-08-22 RX ORDER — ONDANSETRON 2 MG/ML
INJECTION INTRAMUSCULAR; INTRAVENOUS PRN
Status: DISCONTINUED | OUTPATIENT
Start: 2024-08-22 | End: 2024-08-22

## 2024-08-22 RX ORDER — ONDANSETRON 4 MG/1
4 TABLET, ORALLY DISINTEGRATING ORAL EVERY 30 MIN PRN
Status: DISCONTINUED | OUTPATIENT
Start: 2024-08-22 | End: 2024-08-23 | Stop reason: HOSPADM

## 2024-08-22 RX ORDER — DEXAMETHASONE SODIUM PHOSPHATE 4 MG/ML
INJECTION, SOLUTION INTRA-ARTICULAR; INTRALESIONAL; INTRAMUSCULAR; INTRAVENOUS; SOFT TISSUE PRN
Status: DISCONTINUED | OUTPATIENT
Start: 2024-08-22 | End: 2024-08-22

## 2024-08-22 RX ORDER — LIDOCAINE 40 MG/G
CREAM TOPICAL
Status: DISCONTINUED | OUTPATIENT
Start: 2024-08-22 | End: 2024-08-23 | Stop reason: HOSPADM

## 2024-08-22 RX ORDER — DEXMEDETOMIDINE HYDROCHLORIDE 4 UG/ML
INJECTION, SOLUTION INTRAVENOUS PRN
Status: DISCONTINUED | OUTPATIENT
Start: 2024-08-22 | End: 2024-08-22

## 2024-08-22 RX ORDER — ONDANSETRON 2 MG/ML
4 INJECTION INTRAMUSCULAR; INTRAVENOUS EVERY 30 MIN PRN
Status: DISCONTINUED | OUTPATIENT
Start: 2024-08-22 | End: 2024-08-23 | Stop reason: HOSPADM

## 2024-08-22 RX ORDER — OXYCODONE HCL 5 MG/5 ML
5 SOLUTION, ORAL ORAL EVERY 6 HOURS PRN
Qty: 60 ML | Refills: 0 | Status: SHIPPED | OUTPATIENT
Start: 2024-08-22 | End: 2024-08-25

## 2024-08-22 RX ORDER — OXYCODONE HYDROCHLORIDE 5 MG/1
5 TABLET ORAL
Status: DISCONTINUED | OUTPATIENT
Start: 2024-08-22 | End: 2024-08-23 | Stop reason: HOSPADM

## 2024-08-22 RX ORDER — PROPOFOL 10 MG/ML
INJECTION, EMULSION INTRAVENOUS PRN
Status: DISCONTINUED | OUTPATIENT
Start: 2024-08-22 | End: 2024-08-22

## 2024-08-22 RX ORDER — GLYCOPYRROLATE 0.2 MG/ML
INJECTION, SOLUTION INTRAMUSCULAR; INTRAVENOUS PRN
Status: DISCONTINUED | OUTPATIENT
Start: 2024-08-22 | End: 2024-08-22

## 2024-08-22 RX ORDER — FENTANYL CITRATE 50 UG/ML
50 INJECTION, SOLUTION INTRAMUSCULAR; INTRAVENOUS EVERY 5 MIN PRN
Status: DISCONTINUED | OUTPATIENT
Start: 2024-08-22 | End: 2024-08-23 | Stop reason: HOSPADM

## 2024-08-22 RX ORDER — LIDOCAINE HYDROCHLORIDE AND EPINEPHRINE 10; 10 MG/ML; UG/ML
INJECTION, SOLUTION INFILTRATION; PERINEURAL PRN
Status: DISCONTINUED | OUTPATIENT
Start: 2024-08-22 | End: 2024-08-22 | Stop reason: HOSPADM

## 2024-08-22 RX ORDER — AMOXICILLIN 400 MG/5ML
600 POWDER, FOR SUSPENSION ORAL 2 TIMES DAILY
Qty: 150 ML | Refills: 0 | Status: SHIPPED | OUTPATIENT
Start: 2024-08-22

## 2024-08-22 RX ORDER — OXYCODONE HYDROCHLORIDE 5 MG/1
10 TABLET ORAL
Status: COMPLETED | OUTPATIENT
Start: 2024-08-22 | End: 2024-08-22

## 2024-08-22 RX ADMIN — SODIUM CHLORIDE, SODIUM LACTATE, POTASSIUM CHLORIDE, CALCIUM CHLORIDE: 600; 310; 30; 20 INJECTION, SOLUTION INTRAVENOUS at 11:15

## 2024-08-22 RX ADMIN — ONDANSETRON 4 MG: 2 INJECTION INTRAMUSCULAR; INTRAVENOUS at 10:30

## 2024-08-22 RX ADMIN — OXYCODONE HYDROCHLORIDE 10 MG: 5 TABLET ORAL at 11:42

## 2024-08-22 RX ADMIN — Medication 50 MG: at 10:19

## 2024-08-22 RX ADMIN — Medication 0.5 MG: at 10:18

## 2024-08-22 RX ADMIN — SODIUM CHLORIDE, SODIUM LACTATE, POTASSIUM CHLORIDE, CALCIUM CHLORIDE: 600; 310; 30; 20 INJECTION, SOLUTION INTRAVENOUS at 09:39

## 2024-08-22 RX ADMIN — GLYCOPYRROLATE 0.2 MG: 0.2 INJECTION, SOLUTION INTRAMUSCULAR; INTRAVENOUS at 10:19

## 2024-08-22 RX ADMIN — SODIUM CHLORIDE, SODIUM LACTATE, POTASSIUM CHLORIDE, CALCIUM CHLORIDE: 600; 310; 30; 20 INJECTION, SOLUTION INTRAVENOUS at 10:00

## 2024-08-22 RX ADMIN — LIDOCAINE HYDROCHLORIDE 100 MG: 20 INJECTION, SOLUTION INFILTRATION; PERINEURAL at 10:18

## 2024-08-22 RX ADMIN — DEXMEDETOMIDINE HYDROCHLORIDE 12 MCG: 4 INJECTION, SOLUTION INTRAVENOUS at 10:19

## 2024-08-22 RX ADMIN — PROPOFOL 50 MCG/KG/MIN: 10 INJECTION, EMULSION INTRAVENOUS at 10:24

## 2024-08-22 RX ADMIN — DEXMEDETOMIDINE HYDROCHLORIDE 8 MCG: 4 INJECTION, SOLUTION INTRAVENOUS at 10:34

## 2024-08-22 RX ADMIN — PROPOFOL 250 MG: 10 INJECTION, EMULSION INTRAVENOUS at 10:18

## 2024-08-22 RX ADMIN — DEXAMETHASONE SODIUM PHOSPHATE 10 MG: 4 INJECTION, SOLUTION INTRA-ARTICULAR; INTRALESIONAL; INTRAMUSCULAR; INTRAVENOUS; SOFT TISSUE at 10:30

## 2024-08-22 RX ADMIN — ACETAMINOPHEN 975 MG: 325 TABLET ORAL at 09:38

## 2024-08-22 NOTE — ANESTHESIA PREPROCEDURE EVALUATION
Anesthesia Pre-Procedure Evaluation    Patient: Van Celaya   MRN: [id number] : 1995        Procedure : Procedure(s):  TONSILLECTOMY          No past medical history on file.   History reviewed. No pertinent surgical history.   Allergies   Allergen Reactions    Nortriptyline Other (See Comments)     Poor sleep and acutely worsening thoughts      Social History     Tobacco Use    Smoking status: Every Day     Types: Cigarettes    Smokeless tobacco: Never    Tobacco comments:     4 cigarettes per day    Substance Use Topics    Alcohol use: Yes     Comment: 30 Beers Per Week       Wt Readings from Last 1 Encounters:   24 99.8 kg (220 lb)        Anesthesia Evaluation        History of anesthetic complications       ROS/MED HX  ENT/Pulmonary:     (+) sleep apnea,                                       Neurologic:  - neg neurologic ROS     Cardiovascular:  - neg cardiovascular ROS     METS/Exercise Tolerance:     Hematologic:  - neg hematologic  ROS     Musculoskeletal:       GI/Hepatic:  - neg GI/hepatic ROS     Renal/Genitourinary:  - neg Renal ROS     Endo:  - neg endo ROS     Psychiatric/Substance Use:       Infectious Disease:  - neg infectious disease ROS     Malignancy:       Other:            Physical Exam    Airway  airway exam normal      Mallampati: II       Respiratory Devices and Support         Dental     Comment: Chipped front teeth    (+) Minor Abnormalities - some fillings, tiny chips      Cardiovascular   cardiovascular exam normal          Pulmonary   pulmonary exam normal                OUTSIDE LABS:  CBC:   Lab Results   Component Value Date    WBC 9.3 2023    HGB 14.5 2023    HCT 41.1 2023     2023     BMP:   Lab Results   Component Value Date     (L) 2023    POTASSIUM 4.5 2023    CHLORIDE 95 (L) 2023    CO2 22 2023    BUN 8.9 2023    CR 0.97 2023     (H) 2023     COAGS: No results found for:  "\"PTT\", \"INR\", \"FIBR\"  POC: No results found for: \"BGM\", \"HCG\", \"HCGS\"  HEPATIC:   Lab Results   Component Value Date    ALBUMIN 4.6 05/11/2023    PROTTOTAL 7.6 05/11/2023    ALT 21 05/11/2023    AST 22 05/11/2023    ALKPHOS 65 05/11/2023    BILITOTAL 0.6 05/11/2023     OTHER:   Lab Results   Component Value Date    ELIZABETH 9.8 05/11/2023    LIPASE 17 05/11/2023    SED 5 06/26/2024       Anesthesia Plan    ASA Status:  2    NPO Status:  NPO Appropriate    Anesthesia Type: General.     - Airway: ETT   Induction: Intravenous, Propofol.   Maintenance: TIVA.        Consents    Anesthesia Plan(s) and associated risks, benefits, and realistic alternatives discussed. Questions answered and patient/representative(s) expressed understanding.     - Discussed: Risks, Benefits and Alternatives for the PROCEDURE were discussed     - Discussed with:  Patient            Postoperative Care    Pain management: Oral pain medications, IV analgesics, Multi-modal analgesia.   PONV prophylaxis: Ondansetron (or other 5HT-3), Dexamethasone or Solumedrol, Background Propofol Infusion     Comments:               Manjinder Marin MD    I have reviewed the pertinent notes and labs in the chart from the past 30 days and (re)examined the patient.  Any updates or changes from those notes are reflected in this note.                  "

## 2024-08-22 NOTE — ANESTHESIA PROCEDURE NOTES
Airway       Patient location during procedure: OR       Procedure Start/Stop Times: 8/22/2024 10:22 AM  Staff -        CRNA: Letitia Melendez APRN CRNA       Performed By: CRNA  Consent for Airway        Urgency: elective  Indications and Patient Condition       Indications for airway management: roro-procedural       Induction type:intravenous       Mask difficulty assessment: 1 - vent by mask    Final Airway Details       Final airway type: endotracheal airway       Successful airway: WILLIAMS  Endotracheal Airway Details        ETT size (mm): 7.5       Cuffed: yes       Successful intubation technique: direct laryngoscopy       DL Blade Type: Brock 2       Grade View of Cords: 2       Adjucts: stylet       Position: Center       Measured from: gums/teeth       Bite block used: None    Post intubation assessment        Placement verified by: capnometry, equal breath sounds and chest rise        Number of attempts at approach: 1       Secured with: silk tape       Ease of procedure: easy       Dentition: Intact and Unchanged    Medication(s) Administered   Medication Administration Time: 8/22/2024 10:22 AM

## 2024-08-22 NOTE — ANESTHESIA CARE TRANSFER NOTE
Patient: Van Celaya    Procedure: Procedure(s):  TONSILLECTOMY and adenoidectomy       Diagnosis: Enlarged tonsils [J35.1]  Chronic tonsillitis [J35.01]  Tonsil stone [J35.8]  MOO (obstructive sleep apnea) [G47.33]  Diagnosis Additional Information: No value filed.    Anesthesia Type:   General     Note:    Oropharynx: oropharynx clear of all foreign objects and spontaneously breathing  Level of Consciousness: drowsy  Oxygen Supplementation: face mask  Level of Supplemental Oxygen (L/min / FiO2): 4  Independent Airway: airway patency satisfactory and stable  Dentition: dentition unchanged  Vital Signs Stable: post-procedure vital signs reviewed and stable  Report to RN Given: handoff report given  Patient transferred to: PACU    Handoff Report: Identifed the Patient, Identified the Reponsible Provider, Reviewed the pertinent medical history, Discussed the surgical course, Reviewed Intra-OP anesthesia mangement and issues during anesthesia, Set expectations for post-procedure period and Allowed opportunity for questions and acknowledgement of understanding  Vitals:  Vitals Value Taken Time   BP     Temp     Pulse     Resp     SpO2         Electronically Signed By: SHEMAR Hunt CRNA  August 22, 2024  11:28 AM

## 2024-08-22 NOTE — OP NOTE
Name: Van Celaya   MR#: [id number]   : 1995   Procedure date: 2024  Surgeon:    Shelly Barton MD.  Preoperative diagnosis:  1. Chronic Tonsillitis      2. Recurrent Tonsillitis      3. Adenoid vegetation  Postoperaive Diagnosis:  1. chronic tonsillitis      2. Recurrent Tonsillitis  Procedure performed:   Tonsillectomy; Bilateral and Adenoidectomy  Anesthesia:    General Endotracheal anesthesia  Estimated Blood Loss:  5 ml.  Complications:    None  Specimen:   Bilateral palatine tonsils  Findings:    Inflamed and scarred tonsils bilaterally and significant hypertrophy of the tonsils.  Disposition:   To the postanesthesia care unit in stable condition.    Procedure in detail:   The patient was identified in the preoperative area and after getting the informed consent, the patient was transferred to the operating room, placed on the operating table in supine position. He was then endotracheally intubated by anesthesia without difficulty. Bed was then turned and he was prepped and draped appropriately. A McIvor mouth probe was placed into the mouth in order to elevate the tongue to visualize the tonsils. It was clamped to the Claros stent in normal manner. The right to the tonsil was then retracted medially and inferiorly using Allis clamp. The tonsil freer was used to free the superior pole of the tonsil from the underlying tonsillar fossa. The dissection and bleeding control were carried out until the entire tonsil was removed by coblator on a setting of medium 3 for coagulation, 7 for ablation. There was significant vascularities of the tonsillary bed as well as some dominant scar in areas. After removal of the right tonsil, hemostasis was maintained by suction cautery. The left tonsil was then retracted medially and inferiorly using the Allis clamp. [place] was used to remove the tonsil from its  bed. The dissection was carried out until the entire tonsil was removed. Hemostasis was again  maintained with coblator on a setting of 3 for coagulation, 7 for ablation.  After removal of the tonsils, the oral cavity was irrigated with normal saline and there was no active bleeding with good hemostasis.    Then the attention was directed to the adenoid vegetation. A Martínez rubber was placed into each naris and clamped to itself to elevate the palate. Next a mirror was used to visualize the adenoid bed. There was a small adenoid hypertrophy, which was removed by using the coblator on a medium setting. Homeostasis was maintained by the coblator. Then After removal of the tonsils and adenoid, the oral cavity was irrigated with normal saline and there was no active bleeding with good hemostasis.  The patient was then turned back to anesthesia and extubated in the operating room without complications and transferred to postanesthesia care unit in stable condition.

## 2024-08-22 NOTE — TELEPHONE ENCOUNTER
Patient gave consent over the phone to communicate with his girlfriend, Roxie. Patient was calling to confirm that he was taking the appropriate dose of his oxycodone pain medication post-tonsillectomy. Using the AVS from the procedure today, confirmed with patient's girlfriend the medication dosage and the dietary recommendations post-tonsillectomy.  Advised to call back if any additional questions/concerns.      Reason for Disposition   Caller requesting information about Post-Op pain or medicines   Post-Op tonsil and adenoid surgery, symptoms or questions about   Health Information question, no triage required and triager able to answer question    Protocols used: Medication Question Call-A-AH, Post-Op Symptoms and [id number], Information Only Call - No Triage-A-AH

## 2024-08-22 NOTE — ANESTHESIA POSTPROCEDURE EVALUATION
Patient: Van Celaya    Procedure: Procedure(s):  TONSILLECTOMY and adenoidectomy       Anesthesia Type:  General    Note:  Disposition: Outpatient   Postop Pain Control: Uneventful            Sign Out: Well controlled pain   PONV: No   Neuro/Psych: Uneventful            Sign Out: Acceptable/Baseline neuro status   Airway/Respiratory: Uneventful            Sign Out: Acceptable/Baseline resp. status   CV/Hemodynamics: Uneventful            Sign Out: Acceptable CV status; No obvious hypovolemia; No obvious fluid overload   Other NRE: NONE   DID A NON-ROUTINE EVENT OCCUR?            Last vitals:  Vitals Value Taken Time   /96 08/22/24 1200   Temp 97.5  F (36.4  C) 08/22/24 1145   Pulse 82 08/22/24 1200   Resp 12 08/22/24 1200   SpO2 98 % 08/22/24 1200       Electronically Signed By: Manjinder Marin MD  August 22, 2024  12:48 PM

## 2024-08-22 NOTE — DISCHARGE INSTRUCTIONS
You were given 975mg Tylenol at 9:40am. You may take more Tylenol after 3:40pm. Do not take more than 4000mg of Tylenol in a 24 hour period.     Postoperative Care for Tonsillectomy (with or without adenoidectomy)    Recovery - There are a handful of issues that routinely occur during recover that should be anticipated during your recovery.    The pain and swelling almost always gets worse before it gets better, this is normal.  Usually it peaks 3 to 5 days after the surgery, and then begins improving at 7 to 8 days after surgery.  Of course, this is variable from person to person.  The only dietary restriction is avoidance of hard or crunchy things until I see you in follow up.  If it makes a noise when you bite it, it is too hard.  Although it is good to begin eating again from day one, it is not unusual to not eat for several days after the procedure.  The most important thing is staying hydrated.  Drink fluids with electrolytes if possible, such as sports drinks.  The pain medication you were sent home with can make some people very nauseated.  To minimize this, avoid taking it on an empty stomach, or take smaller does with greater frequency.  For example if your dose is 2 teaspoons every four hours, try taking one teaspoon every two hours, etc.  Antibiotic are sometimes given after surgery, not to prevent infection, but some research shows that it helps to decrease pain.  This is not absolutely proven, and therefore is not absolutely necessary.   Try to stay ahead of the pain.  In other words, do not wait for pain medication to completely wear off before taking more pain medicine.  Instead, take the medication every 4 to 6 hours, even if it requires setting an alarm clock at night.  This is especially helpful during the first 5 days.  The uvula ( the small hanging object in the back of your mouth) frequently swells up after tonsillectomy, but will go back to normal.  This swelling can temporarily cause the  sensation of something being stuck in your throat, it will go away with recovery.  Also, because of the arrangement of nerves under where the tonsils were, sharp ear pain is very common during recovery, and will also go away with recovery.   With adenoidectomy, a very strong and foul-smelling odor can occur about 4-7 days after surgery.  This fades rapidly, and unless there is an associated fever no antibiotics are necessary.  It is very common after tonsillectomy to experience ear pain. This is due to nerves on the side of the throat becoming inflamed, and causing the perception of sudden episodes of ear pain.  This can be controlled with the same pain medication given for the surgery.     Activity - Avoid heavy lifting (greater than 20 pounds), strenuous exercise, or extremely cold environments until the follow up appointment.  Also, try to sleep with your head elevated.  An irritated cough from the breathing tube is fairly normal after surgery.    Medications - Except blood thinners, almost all medication can be re-started after tonsillectomy.      Complications - Bleeding is by far the most common complication after tonsillectomy.  If there are a few small drops or streaks of blood in the saliva that then goes away, this can be conservatively watched.  Gentle gargling with the ice water can also help stop this minor bleeding.  However, if the bleeding is persistent, or heavy bleeding occurs, do not hesitate.  Go to the emergency room to be evaluated.    Follow up - I like to see my patients about 2 weeks after the procedure to make sure that everything is healing appropriately.  Occasionally, there can be some longer - lasting side effects of surgery such as abnormal tongue sensations, or unusual swallowing.  However, if everything is healing well, the 2 week postoperative visit is all that will be necessary.    If there are any questions or issues with the above, or if there are other issues that concern you,  always feel free to call the clinic and I am happy to speak with you as soon as I can.    Tonsillectomy and Adenoidectomy    What is a tonsillectomy and adenoidectomy?    Tonsillectomy is removal of the tonsils. Adenoidectomy is removal of the adenoids. Tonsils and adenoids are lumps of tissue at the back of the throat. The tonsils and adenoids fight infection. Your child may need the tonsillectomy if he has many bad colds, sore throats, or ear infections. Tonsillectomy and adenoidectomy (T&A) are often done together.    What can I expect after Surgery?    It is common to have an upset stomach and vomiting during the first 24 hours after surgery.    Your child s throat may be sore for two weeks, especially when eating. The soreness may get better after a few days and then get worse again. Your child s voice may change a little after surgery.    Ear pain is common, often when swallowing, because the ear and throat have a common sensory nerve. Jaw spasms, or uncontrollable movement of the jaw, may also occur and cause pain.    Neck soreness is common after an adenoidectomy and usually last about one week.    Your child will have bad breath for a few weeks because your child s throat is swollen, snoring is common after surgery but should go away after about two weeks.    How should I care for my child?    Encourage your child to drink plenty of liquids (at least 2 -3 ounces per hour)  keeping the throat moist decreases discomfort and prevents dehydration( a  dangerous condition in which the body gets dried out.)    Give pain medication regularly within the limits directed by your doctor. Give  it before bed and first thing after waking in the morning. Try to give the   pain medication 30 minutes before meals to help make swallowing easier.    To prevent bleeding, avoid coughing, nose-blowing, clearing throat, and   spitting. Wipe nose gently if needed. When sneezing, encourage your child to   Open the mouth and make a  sound, to prevent pressure buildup.    Avoid coming in contact with people who have colds, flu, or infections.      What can my child eat?    The day of surgery, give only cool, Clear liquids such as:    Apple Juice  Jell-o*  Roderick-aid*  Popsicles*  Flat pop (remove bubbles)  Water    If your child has an upset stomach, give small amounts often. Note: If   Your child vomits after drinking red liquids the vomit will be the same  color.    After the first day, when your child wants them add dairy and soft foods such as:  Ice cream  Milk shakes(use spoon)  Pudding  Smooth yogurt  Liquids are more important than food.    Be sure your child is drinking a lot.    When your child wants them, add soft foods (foods without rough  edges). See the chart for ideas. If a food is not on the list ask yourself:    Is it easy to chew? Is it free of coarse, rough, or crispy edges?  If the answer  is yes, your child can probably eat it.    Be sure to cut foods very small and encourage your child to chew them  well. Continue the soft diet for 2 weeks after surgery Avoid citrus fruits and juices   such as orange juice and lemonade, as they may sting your child s throat. Avoid  foods that are hot in temperature or spicy hot.                               May Eat Should not eat   - Soft bread  - Soggy waffles or   German toast (no crusts).  Soaked in syrup  - Pancakes  - Scrambled or   poached egg   - Toast  - Crispy waffles  - Fried foods   - Oatmeal,or   Creamy cereal  - Soggy cold cereal  (soaked in milk   - Crunchy cold   cereal   - Soup  - Hot dogs  - Hamburgers  - Tender, moist  meat  - Pasta, noodles  - Spaghetti-Os  - Macaroni and  Cheese   - Tough, dry meat,  chicken or fish   - Milk  - Custard, pudding  - Ice cream  - Malts, shakes  - Yogurt (smooth)  - Cottage cheese   - Cookies  - Crackers  - Pretzels  - Chips  - Popcorn  - Nuts   - Sandwiches, (no crusts)  - Smooth peanut butter   and jelly  - Processed cheese  - Tuna -  Grilled cheese  sandwiches   - Cooked vegetables  - Mashed potatoes - Raw vegetables   - Tomatoes   - Applesauce  - Bananas  - Canned fruits  - Watermelon with out  seeds - Citrus fruits  - Moist fresh fruits   - Juices (not citrus)  - Roderick aid  - Flat pop (no bubbles)  - Jell-O - Citrus juices  - Pop with bubbles        To Contact Dr Barton or On- Call MD    Call Daytime Business hours:  [phone]    After hours:  On Call Resident (ENT)  [phone]    Dr. Barton Cell Phone Number  [phone]

## 2024-08-27 LAB
PATH REPORT.COMMENTS IMP SPEC: NORMAL
PATH REPORT.COMMENTS IMP SPEC: NORMAL
PATH REPORT.FINAL DX SPEC: NORMAL
PATH REPORT.GROSS SPEC: NORMAL
PATH REPORT.MICROSCOPIC SPEC OTHER STN: NORMAL
PATH REPORT.RELEVANT HX SPEC: NORMAL
PHOTO IMAGE: NORMAL

## (undated) DEVICE — MARKER SKIN DOUBLE TIP W/FLEXI-RULER W/LABELS

## (undated) DEVICE — CATH INTERMITTENT CLEAN-CATH 8FR 16" VINYL LF 421708

## (undated) DEVICE — NDL 19GA 1.5"

## (undated) DEVICE — PACK SET-UP STD 9102

## (undated) DEVICE — BOWL 32OZ STERILE 01232

## (undated) DEVICE — SOL NACL 0.9% IRRIG 1000ML BOTTLE 07138-09

## (undated) DEVICE — SYR EAR BULB 3OZ 0035830

## (undated) DEVICE — ANTIFOG SOLUTION W/FOAM PAD CF-1001

## (undated) DEVICE — NDL 25GA 1.5" 305127

## (undated) DEVICE — GLOVE BIOGEL PI ULTRATOUCH G SZ 7.5 42175

## (undated) DEVICE — SUCTION TIP YANKAUER W/O VENT K86

## (undated) DEVICE — GOWN XLG DISP 9545

## (undated) DEVICE — SOL NACL 0.9% INJ 1000ML BAG 07983-09

## (undated) DEVICE — TUBING ESURG ENT SAL DISP 72290135

## (undated) DEVICE — DRAPE SHEET HALF 40X60" 9358

## (undated) DEVICE — TUBING SUCTION 10'X3/16" N510

## (undated) DEVICE — SYR 10ML FINGER CONTROL W/O NDL 309695

## (undated) DEVICE — CONTAINER SPECIMEN 4OZ STERILE 17099

## (undated) DEVICE — SOL WATER IRRIG 1000ML BOTTLE 07139-09

## (undated) RX ORDER — DEXAMETHASONE SODIUM PHOSPHATE 4 MG/ML
INJECTION, SOLUTION INTRA-ARTICULAR; INTRALESIONAL; INTRAMUSCULAR; INTRAVENOUS; SOFT TISSUE
Status: DISPENSED
Start: 2024-08-22

## (undated) RX ORDER — ONDANSETRON 2 MG/ML
INJECTION INTRAMUSCULAR; INTRAVENOUS
Status: DISPENSED
Start: 2024-08-22

## (undated) RX ORDER — ACETAMINOPHEN 325 MG/1
TABLET ORAL
Status: DISPENSED
Start: 2024-08-22

## (undated) RX ORDER — GLYCOPYRROLATE 0.2 MG/ML
INJECTION, SOLUTION INTRAMUSCULAR; INTRAVENOUS
Status: DISPENSED
Start: 2024-08-22

## (undated) RX ORDER — PROPOFOL 10 MG/ML
INJECTION, EMULSION INTRAVENOUS
Status: DISPENSED
Start: 2024-08-22

## (undated) RX ORDER — OXYCODONE HYDROCHLORIDE 5 MG/1
TABLET ORAL
Status: DISPENSED
Start: 2024-08-22